# Patient Record
Sex: FEMALE | Race: WHITE | NOT HISPANIC OR LATINO | URBAN - METROPOLITAN AREA
[De-identification: names, ages, dates, MRNs, and addresses within clinical notes are randomized per-mention and may not be internally consistent; named-entity substitution may affect disease eponyms.]

---

## 2024-05-06 ENCOUNTER — CONSULT (OUTPATIENT)
Dept: ENDOCRINOLOGY | Facility: CLINIC | Age: 39
End: 2024-05-06
Payer: COMMERCIAL

## 2024-05-06 DIAGNOSIS — Z31.41 FERTILITY TESTING: ICD-10-CM

## 2024-05-06 DIAGNOSIS — Z31.89 ENCOUNTER FOR FERTILITY PLANNING: ICD-10-CM

## 2024-05-06 DIAGNOSIS — Z31.69 ENCOUNTER FOR PRECONCEPTION CONSULTATION: Primary | ICD-10-CM

## 2024-05-06 PROCEDURE — 99214 OFFICE O/P EST MOD 30 MIN: CPT | Performed by: NURSE PRACTITIONER

## 2024-05-06 PROCEDURE — 99204 OFFICE O/P NEW MOD 45 MIN: CPT | Performed by: NURSE PRACTITIONER

## 2024-05-06 ASSESSMENT — LIFESTYLE VARIABLES
TOBACCO_USE: NO
HISTORY_ALCOHOL_USE: YES

## 2024-05-06 NOTE — PROGRESS NOTES
Visit Type: In Person    NEW FERTILITY PATIENT VISIT    Referred by: Referred by:: N/a    Accompanied today by: Who is accompanying you to your visit:: Christian (Rj Sanderson)      Marcy Putnam is a 39 y.o. G0 female who presents with Please tell us your reason for this visit today: Other  Chronic Kidney Disease Stage 2     Have you had any concerns about your fertility treatments so far? Have you had any concerns about your fertility treatments so far: We’ve had a failed cycle.     What are you goals for today's visit? What are you goals for today's visit: To get info about cost and process at  and to see if there are changes that can be made so we are more successful next time     What causes of infertility have been identified on your workup so far? What causes of infertility have been identified on your workup so far: We didnt start this for infertility- just because we have to use a gestational carrier. But we may also be dealing with age related infertility at this point? We got 2 embryos. One had monosomy 16 and the other had monosomy 18 and xyy.     Past Infertility Treatments: Have you undergone any treatments for fertility: Yes   1 Cycle of IVF at [facility]  Natural start  Antagonist, , 5 units low dose hcg   5 oocytes  3 mature  2 fertilized  2 Blasts,   PGT-A   1 monosomy 16  1 monosomy 18 XYY    Please summarize your fertility treatments to date. Please summarize your fertility treatments to date: see above       PRIOR EVALUATION / TREATMENT  Hysterosalpingogram: None, N/A  Saline Infused Sonography: None, N/A  GYN Pelvic Ultrasound: Yes at [facility]   Impression   Arcuate appearing anteverted uterus that measures 77 mm x 35 mm x 53 mm.   Two echogenic areas superior to the fundal aspect of the endometrium.   Left hemorrhagic cyst.   There is no free fluid visualized in the peritoneal cavity.     Recommendations   Follow up as clinically indicated.     Menstrual History   LMP on 1/13/2023     Method  "  Transvaginal ultrasound examination     Uterus   Uterus: Visualized   Uterus position: anteverted   Description of uterine malformations: arcuate   Myometrium: There are two echogenic, nonvascular areas superior to the fundal   aspect of the endometrium measuring 6 x 4 x 5mm and 7 x 4 x 5mm. This appears to   be   separate from the endometrium and does not look like a typical fibroid. Previous   pelvis MRI on 12-22-17 suggestive of subendometrial cyst vs focal adenomyosis.   Uterus long 77 mm   Uterus ap 35 mm   Uterus tr 53 mm   Uterus Vol 73.2 cm³   Endometrial thickness single layer 7.0 mm   Endom. th. single layer 5.0 mm   Side: right   Side: left   Endometrial thickness, total 6.2 mm     Right Ovary   Rt ovary: Visualized   Rt ovary morphology: normal   Rt ovary D1 20 mm   Rt ovary D2 16 mm   Rt ovary D3 13 mm   Rt ovary Vol 2.2 cm³     Left Ovary   Lt ovary: Visualized   Lt ovary morphology: normal   Lt ovary D1 31 mm   Lt ovary D2 20 mm   Lt ovary D3 19 mm   Lt ovary Vol 6.2 cm³   Lt ovarian cyst D1 17 mm   Lt ovarian cyst D2 10 mm   Lt ovarian cyst D3 16 mm   Lt ovarian cyst mean 14.0 mm   Lt ovarian cyst vol 1.336 cm³   Lt ovarian cyst findings: hemorrhagic cyst- thick rimmed       Prior Labs  AMH 0.84       Relationship Status:  engaged getting  in June 2024     OB Hx  G0  OB History    No obstetric history on file.         GYN HISTORY   Have you ever been diagnosed with a sexually transmitted disease? Have you ever been diagnosed with a sexually transmitted disease?: No    Please select all that are applicable:  n/a  Have you ever had Pelvic Inflammatory Disease? Have you ever had Pelvic Inflammatory Disease?: No    Have you had an abnormal PAP smear? Have you had an abnormal PAP smear?: Yes  HPV +, had Colonoscopy 2014 and LEEP 2014  all paps since LEEP have been normal     Date & Result of last PAP smear: No results found for: \"FINALINTERP\"   Have you ever had an abnormal Mammogram? Have you " ever had an abnormal mammogram?: No    Date & result of your last mammogram:  none, n/a, no breast concerns, no family history of breast cancer   Do you have pelvic pain? None  How many times per week do you have intercourse? If applicable, how many times a week are you having intercourse, trying to get pregnant during your fertile window?: N/A    Do you have pain with intercourse? Do you have pain with intercourse?: No    Do you use lubricants with intercourse?    Do you have pain with bowel movements? Do you have pain with bowel movements?: Yes  Pt has IBS     Do you have pain with a full bladder? Yes with full bladder and with emptying  MENSTRUAL HISTORY  LMP: When was your last menstrual period?: Other  4/14/2024    Menarche: If applicable, when was your first occurrence of menstruation?: Age 13-14 (7th grade)    Contraception: What (if any) type of birth control do you currently use?: Condoms    Cycle length: What is the average number of days between menstrual cycles?: 27    Describe your bleeding: Describe your bleeding:: Average    Dysmenorrhea: Are your menstrual periods painful?: Yes  Moderate cramps first day of menses      ENDOCRINE/INFERTILITY HISTORY  Duration of infertility: If applicable, what is the approximate date you began trying to get pregnant?: Not applicable    Coital Activity/week: If applicable, how many times a week are you having intercourse, trying to get pregnant during your fertile window?: N/A    Nipple Discharge: Do you experience any loss of milk or liquid discharge from the breasts?: No    Vision changes: Are you experiencing any vision changes?: No    Headaches: Are you experiencing headaches?: No    Excess hair growth: Are you experiencing persistent or worsening hair growth on the face, breasts or lower abdomen?: No    Excessive hair loss: Are you experiencing loss of hair from your scalp?: No    Acne: Are you experiencing acne?: No    Oily skin: Oily skin?: No    Recent weight  "change  Weight gain: Are you experiencing any increase in weight?: No    Weight loss: Are you experiencing any decrease in weight?: No    Exercise more than 3 times a week: Exercise more than 3 times a week?: Yes  Strength training, cardio 2 days per week, peloton 30 minuts at a time    PMH  Chronic Kidney Disease   ADHD   IBS  Past Medical History:   Diagnosis Date    Anxiety disorder, unspecified     Anxiety    Personal history of other diseases of the female genital tract     History of abnormal cervical Papanicolaou smear    Personal history of other specified conditions     History of headache    Raynaud's syndrome without gangrene     Raynauds disease    Renal dysplasia     Cystic dysplasia of one kidney        MEDICATIONS  Lisinopril 5 mg PO daily  Synthroid 75 mcg PO daily   Took Growth hormone shots as a child Age 5 until 15 years old     PSH  Right ankle surgery  Ears \"pinned back as child\"   All below correct and verified   Kidney surgery Age 22 on remaining kidney for stricture   Past Surgical History:   Procedure Laterality Date    CERVICAL BIOPSY  W/ LOOP ELECTRODE EXCISION  11/09/2016    Cervical Loop Electrosurgical Excision (LEEP)    COLPOSCOPY  11/09/2016    Colposcopy    KIDNEY SURGERY  11/07/2016    Kidney Surgery    OTHER SURGICAL HISTORY  11/07/2016    Nephrectomy    OTHER SURGICAL HISTORY  11/09/2016    Pyeloplasty        PSYCH HISTORY  Have you ever been diagnosed with a mental health Issue?: Yes  ADHD   Have you ever been hospitalized for a mental health disorder?: No       SOCIAL HISTORY     Occupation: Your occupation:: Professor  Professor of Nursing at [employer]   Have you ever been incarcerated? Have you ever been incarcerated?: No    Do you have a history of domestic violence? Do you have a history of domestic violence?: No    Do you feel safe at home? Do you feel safe at home?: Yes    Do you have a history of any negative sexual experience such as incest or rape? Do you have a " history of any negative sexual experience such as incest or rape?: No       PARTNER HISTORY  Partner Name: Partner name:: Rj Sanderson    Partner : Partner :: [id number]    Partner email: Partner email address: [email or web address]    Occupation: Partner occupation::     Prior fertility history: Partner Prior Fertility History:: One failed ivf cycle with me  None     PMH: Partner Past Medical History:: L1-4 fused due to back injury  None    PSH: Partner Past Surgical History:: PLF    Smoking:Partner: Recent or current tobacco use: No    Alcohol Use: Partner: Recent or current alcohol use: Yes  Social couple times per week     Drug Use: Partner: Recent or current drug use: No    Medications:  none  Injuries: Partner: Any history of surgeries or injuries in the reproductive area?: No    STD: Have you ever been diagnosed with a sexually transmitted disease?: No    Please select all that are applicable:    SA: Prior Semen Analysis completed?: Yes    SA Results: Where the results normal?: Yes  At [facility] was WNL     FAMILY HISTORY  No family history on file.    CANCER HISTORY    Breast: Breast Cancer: Please answer Yes, No or Unsure. If Yes, please, identify which family member.: No    Ovarian: Ovarian Cancer: Please answer Yes, No or Unsure. If Yes, please, identify which family member.: No    Colon: Colon Cancer: Please answer Yes, No or Unsure. If Yes, please, identify which family member.: Yes, maternal great grandmother. I also recently found out my dad is a carrier for a polyposis gene.    Endometrial: Endometrial Cancer: Please answer Yes, No or Unsure. If Yes, please, identify which family member.: No      FAMILY VTE HISTORY  Family History of Blood Clots: Blood Clots: Please answer Yes, No or Unsure. If Yes, please, identify which family member.: No      GENETIC HISTORY  Ethnic Background  Patient: Ethnic Background Patient:: White    Partner: Ethnic Background Partner:: White    Genetic Disease  in Family  Patient: Patient: Defects and genetic syndromes? Please answer Yes, No or Unsure: No    Partner: Partner: Defects and genetic syndromes? Please answer Yes, No or Unsure: No    Birth Defects in Family  Patient: Patient: Birth defects? Please answer Yes, No or Unsure: Yes    Partner: Partner: Birth defects? Please answer Yes, No or Unsure: No    Genetic screening performed previously:  Yes pt and partner had carrier screening at [facility] also saw Genetic counselor at [facility] to assess if pt's kidney disease was genetic and all testing was negative per pt     BMI:   BMI Readings from Last 1 Encounters:   No data found for BMI     VITALS:  There were no vitals taken for this visit.    ASSESSMENT   39 y.o. 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  Partner SA: Normal per pt SA [facility]     COUNSELING  We discussed causes of infertility including hormonal, egg quality issues, structural problems such as endometriosis, adhesions, or tubal problems, uterine factors such as polyps or fibroids, and sperm issues. Reviewed evaluation of such as well. We discussed various methods for achieving pregnancy in some detail including, ovulation induction, insemination, superovulation and IVF.    We discussed the impact of age on fertility. We discussed that a woman is born with all of the follicles that she will have in her lifetime and that these numbers progressively decrease as the patient reaches menopause. We discussed that women can remain fertile into their late 30’s and even early 40’s, however, chance for success is significantly lower for women who have infertility. We also discussed the higher  "rates of aneuploidy and miscarriage that occur as women age.    We discussed AMH testing and the patient's AMH level, if applicable.  AMH is considered favorable if >1 however this test has many limitations including poor predictive rates of pregnancy. In randomized control trials such as \"Time to conceive\" pts with low AMH levels (<0.7) has similar cumulative pregnancy rates compared with women that had normal AMH levels.  In the \"AMIGOS\" study, AMH did not predict pregnancy rates following OS/IUI for unexplained infertility. However,  AMH is a marker that is helpful to predict how a patient may respond or oocyte yields with FSH and ART treatments, but again there is conflicting data about how this affects pregnancy rates with ART.    Discussed use of Gestational carrier for pregnancy. Discussed family/friend vs Agency Carrier. Discussed ethical, legal, and financial considerations. Discussed timeline and logistics of selecting a GC, FET process, and Pregnancy considerations. Discussed need for independent . Briefly discussed state law r/t GC pregnancies. Discussed need for medical insurance policy for GC pregnancy and delivery. Discussed need for psychology consult with  psychologist prior to creation of embryos and again with identified GC. Discussed FDA guidance for Eligibility Determination for Donors of Human Cells, Tissues, and Cellular and Tissue-Based Products. Discussed that testing is required prior to cryopreservation of gametes and/or creation of embryos. Discussed that if embryos are determined to be ineligible future gestational carrier would need to sign FDA waiver where they are counseled on communicable disease risk.       PLAN  Orders Placed This Encounter   Procedures    Referral to Financial Planning    Referral to Ob-Gyn Behavioral Health    Referral to Maternal Fetal Medicine       GENETIC SCREENING PATIENT  Completed previously will need to obtain records from [facility] on screening " and genetic counseling notes    PARTNER  Yes Semen Analysis: Completed previous  Yes Genetic screening: Completed previously    FOLLOW UP   Consults: MFM consult: indication:CKD, Financial consult, and Psych consult: indicationfreezing for future Gestational Carrier Referral to Neeru Caro  Chart to primary nurse for care coordination and patient check list/education  Enroll in Engaged MD  Take prenatal vitamins, vitamin D 2000 IUs daily  Discussed that PAP and mammogram must be updated if appropriate based on age and clinical history and results received before treatment can begin  Discussed that treatment cannot proceed until checklist items are complete   Additional testing for BMI < 18 or > 40: No NA  Schedule IVF consult with Dr. Root  Obtain records from [facility]   Follow up with me for 3rd party testing and to further discuss GC matching/process after IVF consult with Dr. Ivett JOHNSON Completion:  Ectopic Risk: No  Medically Complex: Yes    Fertility Plan Update:    Earnestine Linn  05/06/2024  8:10 AM

## 2024-05-10 ENCOUNTER — CONSULT (OUTPATIENT)
Dept: ENDOCRINOLOGY | Facility: CLINIC | Age: 39
End: 2024-05-10
Payer: COMMERCIAL

## 2024-05-10 VITALS
SYSTOLIC BLOOD PRESSURE: 121 MMHG | BODY MASS INDEX: 29.99 KG/M2 | HEART RATE: 56 BPM | HEIGHT: 65 IN | WEIGHT: 180 LBS | TEMPERATURE: 98 F | DIASTOLIC BLOOD PRESSURE: 80 MMHG

## 2024-05-10 DIAGNOSIS — Z31.69 PROCREATIVE MANAGEMENT COUNSELING: Primary | ICD-10-CM

## 2024-05-10 PROCEDURE — 99215 OFFICE O/P EST HI 40 MIN: CPT | Performed by: OBSTETRICS & GYNECOLOGY

## 2024-05-10 RX ORDER — CHOLECALCIFEROL (VITAMIN D3) 50 MCG
50 TABLET ORAL DAILY
COMMUNITY

## 2024-05-10 RX ORDER — MULTIVIT-MIN/IRON FUM/FOLIC AC 7.5 MG-4
2 TABLET ORAL DAILY
COMMUNITY

## 2024-05-10 RX ORDER — LISINOPRIL 5 MG/1
5 TABLET ORAL
COMMUNITY
Start: 2023-06-05

## 2024-05-10 RX ORDER — LEVOTHYROXINE SODIUM 75 UG/1
75 TABLET ORAL
COMMUNITY
Start: 2024-02-08

## 2024-05-10 RX ORDER — VALACYCLOVIR HYDROCHLORIDE 500 MG/1
500 TABLET, FILM COATED ORAL
COMMUNITY
Start: 2024-02-15

## 2024-05-10 ASSESSMENT — PATIENT HEALTH QUESTIONNAIRE - PHQ9
1. LITTLE INTEREST OR PLEASURE IN DOING THINGS: NOT AT ALL
SUM OF ALL RESPONSES TO PHQ9 QUESTIONS 1 AND 2: 0
2. FEELING DOWN, DEPRESSED OR HOPELESS: NOT AT ALL

## 2024-05-10 ASSESSMENT — PAIN SCALES - GENERAL: PAINLEVEL: 0-NO PAIN

## 2024-05-10 NOTE — PATIENT INSTRUCTIONS
ASSESSMENT   39 y.o.  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 .  Indication for IVF: Indication for Use of Gestational Carrier Chronic kidney disease  Partner SA: Normal     Discussed use of Gestational carrier for pregnancy. Discussed family/friend vs Agency Carrier. Discussed ethical, legal, and financial considerations. Discussed timeline and logistics of selecting a GC, FET process, and Pregnancy considerations. Discussed need for independent . Briefly discussed state law r/t GC pregnancies. Discussed need for medical insurance policy for GC pregnancy and delivery. Discussed need for psychology consult with  psychologist prior to creation of embryos and again with identified GC. Discussed FDA guidance for Eligibility Determination for Donors of Human Cells, Tissues, and Cellular and Tissue-Based Products. Discussed that testing is required prior to cryopreservation of gametes and/or creation of embryos. Discussed that if embryos are determined to be ineligible future gestational carrier would need to sign FDA waiver where they are counseled on communicable disease risk.      We reviewed IVF and discussed the following:     In-vitro fertilization and embryo transfer  Stimulation Protocols  Oocyte retrieval, risks   Cryopreservation  Assessment of fertilization   Embryo cleavage  Statistics  ICSI/ Assisted hatching  Embryo transfer and preparation for future GC  Risks of OHSS and multiple gestation  Dropped cycles  Use of birth control  Selective reduction  Number of embryos to transfer  Ectopic pregnancy  Team of physicians  Informed consent procedure  Folic acid supplementation  Genetic  screening  PGT-A/PGT-M  Gestational Carrier Agencies  Need for Independent   Psychology Consult (3rd party)     ART Cycle Plan     1. Protocol/Fertility Plan Update:   Lead in: estrace  Stimulation protocol: Antagonist /Menopur 150, Clomid first 5 days of stim (Clomid flare), +HGH  Trigger plan: HCG vs Lupron, consider co-trigger  Pre-retrieval meds: Antibiotics per protocol  Adjuncts: HGH  Notes:      2. Insemination:  Sperm source: partner  Sperm collection method: Fresh with Frozen Backup  Notes:  ICSI: Yes  # of oocytes to be fertilized: all     3. Cryopreservation plan  PGT: PGTA   Freeze all? Yes  Oocyte cryopreservation: No     4. Number of embryos to replace to Gestational Carrier: 1 euploid  Stage of embryo transfer: day 5     5. Patient willing to accept blood transfusion: Yes     6. RN to review chart, initiate IP using Gestational Carrier boarding pass, and assure completion of the following prior to proceeding with IVF stimulation:       No orders of the defined types were placed in this encounter.        Baseline set of STI screening for patient and partner to be completed within the past year. If not, obtain at time of consult.   FDA Viromed screening: For oocyte source completed within 30 days of oocyte retrieval. For sperm source completed within 7 days of sperm collection.  FDA physical exam: ensure completed within 6 months of donation for oocyte and sperm source.  FDA questionnaire: ensure completed within 6 months of donation or repeated if donation since last completion for oocyte and sperm source.  Genetic carrier testing: waiver or carrier screen completed with clearance documentation by provider for both patient and partner (z13.71)  Type & Screen to be completed within the last year (z01.83)  AMH to be completed within the last year (z31.41)  Frozen sperm sample: ensure frozen IP sample (z31.41) or verify donor sperm on site prior to stimulation start date.  Verify in  EMR or obtain copy of patient’s last mammogram (if applicable) and pap smear results for provider review in boarding pass.  Enroll in Engaged MD and complete annual consent forms for IP using GC consent and cryotransport agreements.  Consults: Third Party Nursing, Financial Consult,  and Third Party Psychology Consult: ensure completed prior to IVF baseline  Additional consults Behavior Medicine consult and review what is in the boarding pass.  Legal contracts to be completed prior to FET of gestational carrier.     Fertility Plan Update:  MFM consult as above  Earnestine Linn to get set up for GC if patient decides to proceed after MFM consult  Third party psych consult for GC     Poornima Root  05/10/2024  10:06 AM

## 2024-05-10 NOTE — PROGRESS NOTES
Visit Type: In Person    IVF Note    Patient is a 39 y.o.  female with a history of infertility and desire for Gestational Carrier.    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  [facility]H 0.84  [facility]    Status of fallopian tubes: Not assessed     Saline Ultrasound: Not assessesd  TVUS and MRI suggestive of adenomyosis    Hysteroscopy:  Not assessed    Past Infertility Treatments:  See below  1 Cycle of IVF at [facility]  Natural start  Antagonist, , 5 units low dose hcg   5 oocytes  3 mature  2 fertilized  2 Blasts,   PGT-A   1 monosomy 16  1 monosomy 18 XYY      GYN HISTORY   See prior note from 2024    PM  Chronic Kidney Disease   ADHD   IBS  Lisinopril 5 mg PO daily  Synthroid 75 mcg PO daily   Took Growth hormone shots as a child Age 5 until 15 years old   Past Medical History:   Diagnosis Date    Anxiety disorder, unspecified     Anxiety    Personal history of other diseases of the female genital tract     History of abnormal cervical Papanicolaou smear    Personal history of other specified conditions     History of headache    Raynaud's syndrome without gangrene     Raynauds disease    Renal dysplasia     Cystic dysplasia of one kidney       History of any clotting: No  History of easy bleeding/bruising: No    PSH  History of hospitalizations or surgeries: Yes, See    Past Surgical History:   Procedure Laterality Date    CERVICAL BIOPSY  W/ LOOP ELECTRODE EXCISION   "2016    Cervical Loop Electrosurgical Excision (LEEP)    COLPOSCOPY  2016    Colposcopy    KIDNEY SURGERY  2016    Kidney Surgery    OTHER SURGICAL HISTORY  2016    Nephrectomy    OTHER SURGICAL HISTORY  2016    Pyeloplasty       Genetic screening Hx  Patient: Efraín 2bP: testing done at [facility] negaitve per patient  Sperm Source: partner  Sperm: Efraín 2bP: Negative per patient  Need [facility] records to review  Need to review [facility] records  Yes pt and partner had carrier screening at [facility] also saw Genetic counselor at [facility] to assess if pt's kidney disease was genetic and all testing was negative per pt       Social history  Social History     Tobacco Use    Smoking status: Never     Passive exposure: Never    Smokeless tobacco: Never   Substance Use Topics    Alcohol use: Yes    Drug use: Not Currently     Occupation:  Professor    Family history  No family history on file.    Current Meds  Current Outpatient Medications   Medication Sig Dispense Refill    cholecalciferol (Vitamin D-3) 50 MCG (2000 UT) tablet Take 1 tablet (50 mcg) by mouth once daily.      levothyroxine (Synthroid, Levoxyl) 75 mcg tablet Take 1 tablet (75 mcg) by mouth.      lisinopril 5 mg tablet Take 1 tablet (5 mg) by mouth once daily.      multivitamin with minerals tablet Take 2 tablets by mouth once daily.      PNV no.153/FA/om3/dha/epa/fish (PRENATAL GUMMIES ORAL) Take 4 tablets by mouth once daily.      valACYclovir (Valtrex) 500 mg tablet Take 1 tablet (500 mg) by mouth once daily.       No current facility-administered medications for this visit.       Allergies  Sulfabenzamide, Sulfamethoxazole-trimethoprim, Cephalosporins, and Penicillins    Partner History  Name: Rj Sanderson   :  1987  Occupation:   Prior paternity: No  Pertinent history:   SA in past year: Yes, normal at [facility]      VITALS:  /80   Pulse 56   Temp 36.7 °C (98 °F)   Ht 1.651 m (5' 5\")   Wt 81.6 kg (180 lb)   LMP 2024 " (Exact Date)   BMI 29.95 kg/m²   BMI:   BMI Readings from Last 1 Encounters:   05/10/24 29.95 kg/m²     BMI Testing  [facility]  CBC Within 1 year   CMP Within 1 year   HgbA1c Within 1 year   Mag, Phos, Vit D <18 Within 1 year   MFM > 40  REQ   Wt loss consult > 40 OPT     ASSESSMENT   39 y.o.  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 .  Indication for IVF: Indication for Use of Gestational Carrier Chronic kidney disease  Partner SA: Normal    Discussed use of Gestational carrier for pregnancy. Discussed family/friend vs Agency Carrier. Discussed ethical, legal, and financial considerations. Discussed timeline and logistics of selecting a GC, FET process, and Pregnancy considerations. Discussed need for independent . Briefly discussed state law r/t GC pregnancies. Discussed need for medical insurance policy for GC pregnancy and delivery. Discussed need for psychology consult with  psychologist prior to creation of embryos and again with identified GC. Discussed FDA guidance for Eligibility Determination for Donors of Human Cells, Tissues, and Cellular and Tissue-Based Products. Discussed that testing is required prior to cryopreservation of gametes and/or creation of embryos. Discussed that if embryos are determined to be ineligible future gestational carrier would need to sign FDA waiver where they are counseled on communicable disease risk.     We reviewed IVF and discussed the following:    In-vitro fertilization and embryo transfer  Stimulation Protocols  Oocyte retrieval, risks   Cryopreservation  Assessment of fertilization   Embryo cleavage  Statistics  ICSI/ Assisted hatching  Embryo transfer  and preparation for future GC  Risks of OHSS and multiple gestation  Dropped cycles  Use of birth control  Selective reduction  Number of embryos to transfer  Ectopic pregnancy  Team of physicians  Informed consent procedure  Folic acid supplementation  Genetic screening  PGT-A/PGT-M  Gestational Carrier Agencies  Need for Independent   Psychology Consult (3rd party)    ART Cycle Plan    1. Protocol/Fertility Plan Update:   Lead in: estrace  Stimulation protocol: Antagonist /Menopur 150, Clomid first 5 days of stim (Clomid flare), +HGH  Trigger plan: HCG vs Lupron, consider co-trigger  Pre-retrieval meds: Antibiotics per protocol  Adjuncts: HGH  Notes:     2. Insemination:  Sperm source: partner  Sperm collection method: Fresh with Frozen Backup  Notes:  ICSI: Yes  # of oocytes to be fertilized: all    3. Cryopreservation plan  PGT: PGTA   Freeze all? Yes  Oocyte cryopreservation: No    4. Number of embryos to replace to Gestational Carrier: 1 euploid  Stage of embryo transfer: day 5    5. Patient willing to accept blood transfusion: Yes    6. RN to review chart, initiate IP using Gestational Carrier boarding pass, and assure completion of the following prior to proceeding with IVF stimulation:       No orders of the defined types were placed in this encounter.      Baseline set of STI screening for patient and partner to be completed within the past year. If not, obtain at time of consult.   FDA Viromed screening: For oocyte source completed within 30 days of oocyte retrieval. For sperm source completed within 7 days of sperm collection.  FDA physical exam: ensure completed within 6 months of donation for oocyte and sperm source.  FDA questionnaire: ensure completed within 6 months of donation or repeated if donation since last completion for oocyte and sperm source.  Genetic carrier testing: waiver or carrier screen completed with clearance documentation by provider for both patient and partner  (z13.71)  Type & Screen to be completed within the last year (z01.83)  AMH to be completed within the last year (z31.41)  Frozen sperm sample: ensure frozen IP sample (z31.41) or verify donor sperm on site prior to stimulation start date.  Verify in EMR or obtain copy of patient’s last mammogram (if applicable) and pap smear results for provider review in boarding pass.  Enroll in Engaged MD and complete annual consent forms for IP using GC consent and cryotransport agreements.  Consults: Third Party Nursing, Financial Consult,  and Third Party Psychology Consult: ensure completed prior to IVF baseline  Additional consults Behavior Medicine consult and review what is in the boarding pass.  Legal contracts to be completed prior to FET of gestational carrier.    Fertility Plan Update:  MFM consult as above  [facility] Linn to get set up for GC if patient decides to proceed after MFM consult  Third party psych consult for GC    Poornima Root  05/10/2024  10:06 AM

## 2024-05-13 DIAGNOSIS — N97.9 FEMALE INFERTILITY: ICD-10-CM

## 2024-05-13 DIAGNOSIS — Z01.812 PRE-PROCEDURE LAB EXAM: ICD-10-CM

## 2024-05-13 DIAGNOSIS — Z31.83 ENCOUNTER FOR ASSISTED REPRODUCTIVE FERTILITY CYCLE: ICD-10-CM

## 2024-05-31 ENCOUNTER — TELEMEDICINE (OUTPATIENT)
Dept: BEHAVIORAL HEALTH | Facility: CLINIC | Age: 39
End: 2024-05-31
Payer: COMMERCIAL

## 2024-05-31 DIAGNOSIS — F45.42 PAIN DISORDER ASSOCIATED WITH PSYCHOLOGICAL FACTORS AND MEDICAL CONDITION: Primary | ICD-10-CM

## 2024-05-31 PROCEDURE — 90791 PSYCH DIAGNOSTIC EVALUATION: CPT | Performed by: PSYCHOLOGIST

## 2024-05-31 NOTE — PROGRESS NOTES
Psychosocial consult for third party reproduction  Start time 4 pm  End time 4:45 pm  Documentation 10 min  No falls, tobacco use, SI    Marcy Putnam and Rj Sanderson are requesting IVF using their own gametes or possibly donor oocytes and using a gestational carrier (GC).  Relevant History  Marcy, 39, and Rj, 37 have lived together for 2 years and are getting  in 2 weeks. 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 Rj is a .    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 However, she developed problems over the recent past and now is seen by nephrology and was told that carrying a pregnancy would likely impair her remaining kidney function.  She is meeting with [facility] next week to discuss the risks of carrying a pregnancy.  If not recommended they will freeze embryos after an IVF cycle and then start their search for a GC through an agency.  They have already had 1 IVF cycle at [facility] but the 2 embryos created were aneuploid and not transferred. Therefore, the couple is also aware that they may need donor eggs.  Marcy is grieving the loss of carrying a pregnancy and would be sad about not using her own oocytes. She is aware that one of our division would be happy to work with her if she wanted some psychotherapy around this.  The couple had many questions about using a GC some of which were legal and medical and she was referred back to the clinic for referral for agencies, attorneys and medical information.  They are aware that once a GC is identified either our program or the agency will require an assessment of the GC and we would meet with the intended parents and the GC (and partner if their is one).   Both Marcy and Rj deny any psychiatric history, substance abuse or physical or  sexual abuse.  Impression: It is my clinical opinion that Marcy Putnam and Rj Sanderson are able to give informed consent and have considered the psychosocial issues inherent in the third party reproductive options of using a GC with or without donor oocytes.

## 2024-05-31 NOTE — LETTER
May 31, 2024     Poornima Root MD  [address]  [facility] [address]    Patient: Marcy Putnam   YOB: 1985   Date of Visit: 5/31/2024       Dear Dr. Poornima Root MD:    Thank you for referring Marcy Putnam to me for evaluation. Below are my notes for this consultation.  If you have questions, please do not hesitate to call me. I look forward to following your patient along with you.       Sincerely,     Yanet Angeles, PhD      CC: Earnestine Linn, APRN-CNP  Shanae Araujo RN  ______________________________________________________________________________________    Psychosocial consult for third party reproduction  Start time 4 pm  End time 4:45 pm  Documentation 10 min  No falls, tobacco use, SI    Marcy Putnam and Rj Sanderson are requesting IVF using their own gametes or possibly donor oocytes and using a gestational carrier (GC).  Relevant History  Marcy, 39, and Rj, 37 have lived together for 2 years and are getting  in 2 weeks. 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 Rj is a .    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 However, she developed problems over the recent past and now is seen by nephrology and was told that carrying a pregnancy would likely impair her remaining kidney function.  She is meeting with [facility] next week to discuss the risks of carrying a pregnancy.  If not recommended they will freeze embryos after an IVF cycle and then start their search for a GC through an agency.  They have already had 1 IVF cycle at [facility] but the 2 embryos created were aneuploid and not transferred. Therefore, the couple is also aware that they may need donor eggs.  Marcy is grieving the loss of carrying a pregnancy and would be sad about not using  her own oocytes. She is aware that one of our division would be happy to work with her if she wanted some psychotherapy around this.  The couple had many questions about using a GC some of which were legal and medical and she was referred back to the clinic for referral for agencies, attorneys and medical information.  They are aware that once a GC is identified either our program or the agency will require an assessment of the GC and we would meet with the intended parents and the GC (and partner if their is one).   Both Marcy and Rj deny any psychiatric history, substance abuse or physical or sexual abuse.  Impression: It is my clinical opinion that Marcy Putnam and Rj Sanderson are able to give informed consent and have considered the psychosocial issues inherent in the third party reproductive options of using a GC with or without donor oocytes.

## 2024-06-24 ENCOUNTER — APPOINTMENT (OUTPATIENT)
Dept: ENDOCRINOLOGY | Facility: CLINIC | Age: 39
End: 2024-06-24
Payer: COMMERCIAL

## 2024-06-26 ENCOUNTER — INITIAL PRENATAL (OUTPATIENT)
Dept: MATERNAL FETAL MEDICINE | Facility: CLINIC | Age: 39
End: 2024-06-26
Payer: COMMERCIAL

## 2024-06-26 ENCOUNTER — APPOINTMENT (OUTPATIENT)
Dept: MATERNAL FETAL MEDICINE | Facility: CLINIC | Age: 39
End: 2024-06-26
Payer: COMMERCIAL

## 2024-06-26 VITALS — WEIGHT: 176 LBS | DIASTOLIC BLOOD PRESSURE: 87 MMHG | BODY MASS INDEX: 29.29 KG/M2 | SYSTOLIC BLOOD PRESSURE: 135 MMHG

## 2024-06-26 DIAGNOSIS — O09.519 ADVANCED MATERNAL AGE, PRIMIGRAVIDA, ANTEPARTUM (HHS-HCC): ICD-10-CM

## 2024-06-26 DIAGNOSIS — N18.2 STAGE 2 CHRONIC KIDNEY DISEASE: ICD-10-CM

## 2024-06-26 DIAGNOSIS — Z31.69 ENCOUNTER FOR PRECONCEPTION CONSULTATION: Primary | ICD-10-CM

## 2024-06-26 PROCEDURE — 99214 OFFICE O/P EST MOD 30 MIN: CPT | Performed by: OBSTETRICS & GYNECOLOGY

## 2024-06-26 RX ORDER — UBIDECARENONE 30 MG
30 CAPSULE ORAL DAILY
COMMUNITY

## 2024-06-26 NOTE — PROGRESS NOTES
Marcy Putnam is a 39 y.o. G0 presenting for an MFM preconception consultation from [facility] and Dr. [facility] due to chronic kidney disease. She is without complaints today. ROS is negative.      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 Last creatinine 0.97, P:C 100.   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 Records review confirms this finding    Physical exam deferred for this consultative visit    A/P: 38yo G0 presenting for an [facility] preconception consultation. We discussed the following:   CKD-stage II  Marcy Putnam has stage 2 chronic kidney disease with a baseline creatinine ranging 0.9-1.1 mg/dL that has been stable.  She follows with a nephrologis at [facility] (Dr. Claus Montana), last visit 2024.  We discussed the pregnancy itself is a stress to the body and can cause increased renal dysfunction during pregnancy and can lead to irreversible damage.  The two main risk factors for permanent exacerbation of underlying disease in pregnancy is underlying hypertension and creatinine greater than 1.5 mg/dL.      It has been reported that women with underlying renal disease with creatinine between 1.4 to 1.9 mg/dL the risk of decline in kidney function during pregnancy was approximately 40%.  Up to 1/3 of women were  noted to have irreversible decline in renal function with 10% having end-stage renal disease by 12 months postpartum.   For those with less significant renal dysfunction (creatinine <1.5 mg/dL) there is in increased risk of worsening renal dysfunction but the rate of irreversible decline is less ~0-10%.  As a result we recommend assessment of renal function at least every trimester    There is an increase risk of obstetrical complications in women with underlying renal disease including increased risk of preeclampsia/eclampsia,  birth and fetal growth restriction.  We also discussed that preeclampsia can be more difficult to diagnose in these women secondary to them commonly having baseline proteinuria.  We discussed our recommendation for initiation of aspirin 162mg starting at 12 weeks gestation for pre-eclampsia prevention and fetal surveillance with a level 2 ultrasound, serial growth ultrasound and  testing.    Once she does become pregnant we discussed that it would be reasonable for her to be referred to Maternal Fetal Medicine for her prenatal care.    2. AMA  We discussed the implications of advanced maternal age on pregnancy.  Specifically we discussed the increased risk of aneuploidy and her age-related risk. We discussed our recommendation for aneuploidy screening and detailed anatomic study.  We also discussed the increase risk of hypertensive disorders and gestational diabetes as well as growth restriction with advancing maternal age.    In summary, I see no overt contraindications to assisted reproduction. We discussed that she has an increased risk of fetal growth restriction, preeclampsia (including periviable preeclampsia), and permanent worsening of her renal function (0-10%) but pregnancy is NOT overtly contraindicated. Thank you for allowing me to participate in the care of your patient. Please do not hesitate to contact me with any further questions.     Carmen Stoll,  MD  Maternal Fetal Medicine  Director of Fetal Intervention

## 2024-06-28 ENCOUNTER — TELEMEDICINE (OUTPATIENT)
Dept: ENDOCRINOLOGY | Facility: CLINIC | Age: 39
End: 2024-06-28
Payer: COMMERCIAL

## 2024-06-28 VITALS — HEIGHT: 65 IN | WEIGHT: 176 LBS | BODY MASS INDEX: 29.32 KG/M2

## 2024-06-28 DIAGNOSIS — Z31.89 ENCOUNTER FOR FERTILITY PLANNING: Primary | ICD-10-CM

## 2024-06-28 PROCEDURE — 99214 OFFICE O/P EST MOD 30 MIN: CPT | Performed by: NURSE PRACTITIONER

## 2024-06-28 ASSESSMENT — PAIN SCALES - GENERAL: PAINLEVEL: 0-NO PAIN

## 2024-06-28 NOTE — PROGRESS NOTES
Virtual or Telephone Consent: An interactive audio and video telecommunication system which permits real time communications between the patient (at the originating site) and provider (at the distant site) was utilized to provide this telehealth service and verbal consent was requested and obtained from Marcy Indy on this date, 07/15/24 for a telehealth visit.    Follow Up Visit HPI    Patient is a 39 y.o.  female with Indication for Use of Gestational Carrier CKD  presenting today for follow up visit.     Interval History:   MFM consult with Dr. Stoll, no overt contraindication to pregnancy    Previous HPI obtained by Dr. 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  AMH 0.84  [facility]     Status of fallopian tubes: Not assessed      Saline Ultrasound: Not assessesd  TVUS and MRI suggestive of adenomyosis     Hysteroscopy:  Not assessed     Past Infertility Treatments:  See below  1 Cycle of IVF at [facility]  Natural start  Antagonist, , 5 units low dose hcg   5 oocytes  3 mature  2 fertilized  2 Blasts,   PGT-A   1 monosomy 16  1 monosomy 18 XYY        GYN HISTORY   See prior note from 2024     PMH  Chronic Kidney Disease   ADHD   IBS  Lisinopril 5 mg PO daily  Synthroid 75 mcg PO daily   Took Growth hormone shots as a child Age 5 until 15 years old   Medical History        Past Medical History:   Diagnosis Date    Anxiety disorder, unspecified        Anxiety    Personal history of other diseases of the female genital tract       History of abnormal cervical Papanicolaou smear    Personal history of other specified conditions       History of headache    Raynaud's syndrome without gangrene       Raynauds disease    Renal dysplasia       Cystic dysplasia of one kidney            History of any clotting: No  History of easy bleeding/bruising: No     PSH  History of hospitalizations or surgeries: Yes, See     Surgical History         Past Surgical History:   Procedure Laterality Date    CERVICAL BIOPSY  W/ LOOP ELECTRODE EXCISION   11/09/2016     Cervical Loop Electrosurgical Excision (LEEP)    COLPOSCOPY   11/09/2016     Colposcopy    KIDNEY SURGERY   11/07/2016     Kidney Surgery    OTHER SURGICAL HISTORY   11/07/2016     Nephrectomy    OTHER SURGICAL HISTORY   11/09/2016     Pyeloplasty            Genetic screening Hx  Patient: Myriad 2bP: testing done at [facility] negaitve per patient  Sperm Source: partner  Sperm: [employer] 2bP: Negative per patient  Need [facility] records to review  Need to review [facility] records  Yes pt and partner had carrier screening at [facility] also saw Genetic counselor at [facility] to assess if pt's kidney disease was genetic and all testing was negative per pt         Past Medical History:   Diagnosis Date    Anxiety disorder, unspecified     Anxiety    Personal history of other diseases of the female genital tract     History of abnormal cervical Papanicolaou smear    Personal history of other specified conditions     History of headache    Raynaud's syndrome without gangrene     Raynauds disease    Renal dysplasia     Cystic dysplasia of one kidney     Past Surgical History:   Procedure Laterality Date    CERVICAL BIOPSY  W/ LOOP ELECTRODE EXCISION  11/09/2016    Cervical Loop Electrosurgical Excision (LEEP)    COLPOSCOPY  11/09/2016    Colposcopy    KIDNEY SURGERY  11/07/2016    Kidney Surgery    OTHER SURGICAL HISTORY  11/07/2016    Nephrectomy    OTHER  "SURGICAL HISTORY  2016    Pyeloplasty     Current Outpatient Medications on File Prior to Visit   Medication Sig Dispense Refill    cholecalciferol (Vitamin D-3) 50 MCG (2000 UT) tablet Take 1 tablet (50 mcg) by mouth once daily.      co-enzyme Q-10 30 mg capsule Take 1 capsule (30 mg) by mouth once daily.      levothyroxine (Synthroid, Levoxyl) 75 mcg tablet Take 1 tablet (75 mcg) by mouth.      lisinopril 5 mg tablet Take 1 tablet (5 mg) by mouth once daily.      multivitamin with minerals tablet Take 2 tablets by mouth once daily.      PNV no.153/FA/om3/dha/epa/fish (PRENATAL GUMMIES ORAL) Take 4 tablets by mouth once daily.      valACYclovir (Valtrex) 500 mg tablet Take 1 tablet (500 mg) by mouth once daily.       No current facility-administered medications on file prior to visit.       BMI:   BMI Readings from Last 1 Encounters:   24 29.29 kg/m²     VITALS:  Ht 1.651 m (5' 5\")   Wt 79.8 kg (176 lb)   LMP 2024 (Exact Date)   BMI 29.29 kg/m²   LMP: Patient's last menstrual period was 2024 (exact date).    ASSESSMENT   39 y.o.  female  desires to proceed with IVF freeze all for future gestational carrier  Pertinent medical issues: CKD hx of nephrectomy for cystic dysplastic kidney at birth kidney removed at 2 year of age, hx of kidney surgery on remaining kidney for stricture Age 22, hx of growth hormone as a child x10 years unclear indication of use. .  Indication for IVF: Indication for Use of Gestational Carrier Chronic kidney disease  Partner SA: Normal    COUNSELING  Discussed use of Gestational carrier for pregnancy. Discussed family/friend vs Agency Carrier. Discussed ethical, legal, and financial considerations. Discussed timeline and logistics of selecting a GC, FET process, and Pregnancy considerations. Discussed need for independent . Briefly discussed state law r/t GC pregnancies. Discussed need for medical insurance policy for GC pregnancy and delivery. " Discussed need for psychology consult with  psychologist prior to creation of embryos and again with identified GC. Discussed FDA guidance for Eligibility Determination for Donors of Human Cells, Tissues, and Cellular and Tissue-Based Products. Discussed that testing is required prior to cryopreservation of gametes and/or creation of embryos. Discussed that if embryos are determined to be ineligible future gestational carrier would need to sign FDA waiver where they are counseled on communicable disease risk.       PLAN  No orders of the defined types were placed in this encounter.      FOLLOW UP   Consults: MFM consult: indication:s/p mfm consult see note from Dr. Stoll, Financial consult, and Psych consult: indicationfreezing for future gestational carrier  Engaged MD  Take prenatal vitamins, vitamin D 2000 IUs daily, CoQ10   Follow up with MARI Calvin to coordinate IVF cycle (freezing for future Gestational Carrier) needs to be FDA eligible   Once pt is happy with number of embryos cryopreserved will TTC on own for 3-6 months      Earnestine Linn  06/28/2024  1:28 PM

## 2024-07-29 DIAGNOSIS — E03.8 OTHER SPECIFIED HYPOTHYROIDISM: ICD-10-CM

## 2024-07-29 RX ORDER — LEVOTHYROXINE SODIUM 75 UG/1
75 TABLET ORAL DAILY
Qty: 30 TABLET | Refills: 0 | Status: SHIPPED | OUTPATIENT
Start: 2024-07-29

## 2024-08-22 ENCOUNTER — APPOINTMENT (OUTPATIENT)
Dept: GASTROENTEROLOGY | Facility: CLINIC | Age: 39
End: 2024-08-22
Payer: COMMERCIAL

## 2024-08-30 ENCOUNTER — TELEMEDICINE (OUTPATIENT)
Dept: ENDOCRINOLOGY | Facility: CLINIC | Age: 39
End: 2024-08-30
Payer: COMMERCIAL

## 2024-08-30 DIAGNOSIS — Z31.89 ENCOUNTER FOR FERTILITY PLANNING: Primary | ICD-10-CM

## 2024-08-30 DIAGNOSIS — N92.6 IRREGULAR MENSES: ICD-10-CM

## 2024-08-30 PROCEDURE — 99215 OFFICE O/P EST HI 40 MIN: CPT | Performed by: OBSTETRICS & GYNECOLOGY

## 2024-08-30 NOTE — PROGRESS NOTES
"  Virtual or Telephone Consent: An interactive audio and video telecommunication system which permits real time communications between the patient (at the originating site) and provider (at the distant site) was utilized to provide this telehealth service    Follow Up Visit HPI    Patient is a 39 y.o.  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     Previously has seen audrey and Earnestine Linn to discuss gestational carrier due to CKD.  She saw MFM and now desires to consider pregnancy herself.     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 Last creatinine 0.97, P:C 100. \"    \"In summary, I see no overt contraindications to assisted reproduction. We discussed that she has an increased risk of fetal growth restriction, preeclampsia (including periviable preeclampsia), and permanent worsening of her renal function (0-10%) but pregnancy is NOT overtly contraindicated. Thank you for allowing me to participate in the care of your patient. Please do not hesitate to contact me with any further questions. \"    After this counseling, patient and partner decided to try to conceive naturally.  Attempted pregnancy naturally in August and had biochemical pregnancy--> positive home pregnancy test and delayed/heavy period.     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    Has been using condoms for contraception.       Testing to date:    Latest Reference Range & Units Most Recent   Hemoglobin A1C 4.3 - 5.6 % 5.3 (E)  24 07:50   Thyroid Stimulating Hormone 0.44 - 3.98 mIU/L 2.66  10/3/19 09:24   GLUCOSE 74 - 99 mg/dL 93  10/3/19 09:24   Estimated Average Glucose mg/dL 105 (E)  24 07:50   (E): External lab result  Other: [facility] labs  Creatinine 0.97 (2024)  AMH 0.84 (2024)  TSH 2024 2.410 -- Currently on 75 mcg synthroid     Had had STDs done through FDA lab for Viromeds  10/2023 and 3/2024    Status of fallopian tubes: Not assessed      Saline Ultrasound: Not assessesd  TVUS and MRI suggestive of adenomyosis     Hysteroscopy:  Not assessed       Partner SA: Normal  Name: Rj Sanderson   :  1987  Occupation:   Prior paternity: No  Pertinent history:   SA in past year: Yes, normal at [facility]    Sperm Concentration  >=15.00 M/mL 45.50   Total Sperm Count  M 259.35   % Motile Sperm (%HI + %NP)  >=40 % 52   Forward Progression 2 = Poor to moderate, erratic   Total Motile Sperm  M 134.86   % Normal Forms, WHO (5th ed.)  >=4 % 4         Genetic screening Hx  Patient: Myriad 2bP: testing done at [facility] negaitve per patient  Sperm Source: partner  Sperm: Myriad 2bP: Negative per patient  Need [facility] records to review  Need to review [facility] records  Yes pt and partner had carrier screening at [facility] also saw Genetic counselor at [facility] to assess if pt's kidney disease was genetic and all testing was negative per pt        Treatment to date:   Fertility Cycles       Cycle Name Treatment Start Date Type Outcome    IVF (for future GC) #1  ICSI, Egg Retrieval, Cryopreservation (Embryo) Active          Past Infertility Treatments:  See below  1 Cycle of IVF at [facility]  Natural start  Antagonist, , 5 units low dose hcg   5 oocytes  3 mature  2 fertilized  2 Blasts,   PGT-A   1 monosomy 16  1 monosomy 18  XYY    PMH:  Chronic Kidney Disease   ADHD   IBS  Lisinopril 5 mg PO daily--> Stopped end of July   Synthroid 75 mcg PO daily   Took Growth hormone shots as a child Age 5 until 15 years old   Past Medical History:   Diagnosis Date    Anxiety disorder, unspecified     Anxiety    Personal history of other diseases of the female genital tract     History of abnormal cervical Papanicolaou smear    Personal history of other specified conditions     History of headache    Raynaud's syndrome without gangrene     Raynauds disease    Renal dysplasia     Cystic dysplasia of one kidney     Past Surgical History:   Procedure Laterality Date    CERVICAL BIOPSY  W/ LOOP ELECTRODE EXCISION  11/09/2016    Cervical Loop Electrosurgical Excision (LEEP)    COLPOSCOPY  11/09/2016    Colposcopy    KIDNEY SURGERY  11/07/2016    Kidney Surgery    OTHER SURGICAL HISTORY  11/07/2016    Nephrectomy    OTHER SURGICAL HISTORY  11/09/2016    Pyeloplasty     Current Outpatient Medications on File Prior to Visit   Medication Sig Dispense Refill    cholecalciferol (Vitamin D-3) 50 MCG (2000 UT) tablet Take 1 tablet (50 mcg) by mouth once daily.      co-enzyme Q-10 30 mg capsule Take 1 capsule (30 mg) by mouth once daily.      levothyroxine (Synthroid, Levoxyl) 75 mcg tablet Take 1 tablet (75 mcg) by mouth early in the morning.. Take on an empty stomach first thing in the morning, nothing to eat or drink besides water for 1 hour, as well as other medications. Take prenatals in the evening. 30 tablet 0    lisinopril 5 mg tablet Take 1 tablet (5 mg) by mouth once daily.      multivitamin with minerals tablet Take 2 tablets by mouth once daily.      PNV no.153/FA/om3/dha/epa/fish (PRENATAL GUMMIES ORAL) Take 4 tablets by mouth once daily.      valACYclovir (Valtrex) 500 mg tablet Take 1 tablet (500 mg) by mouth once daily.       No current facility-administered medications on file prior to visit.       BMI:   BMI Readings from Last 1 Encounters:    24 29.29 kg/m²     VITALS:  There were no vitals taken for this visit.  LMP: No LMP recorded.    ASSESSMENT   39 y.o.  female with  years, Diminished Ovarian Reserve and Chronic Kidney Disease  and the following pertinent medical issues: CKD, s/p MFM consult- has decided to try to conceive naturally  and has had 1 biochemical pregnancy on first month trying    COUNSELING  We reviewed the definition of infertility and the recommendation to do a workup after 6 months in women > age 38    We also discussed the impact of age on fertility. We discussed that a woman is born with all of the follicles that she will have in her lifetime and that these numbers progressively decreased as the patient reaches menopause. We discussed fertile woman can remain fertile into their late 30s and even early 40s, however, this number may be lower for women who have infertility. We also discussed the higher rate of aneuploidy that occurs as women age.    Discussed that there are many reasons for a biochemical pregnancy, including embryo aneuploidy; cannot rule out other factors in her situation due to there history of pituitary dysfunction/GH deficiency as a child.     We reviewed that I would not want to start with OI meds as these would increase her risk of multiples which would increase her risk of pregnancy complications- we can consider if there is an indication in the future.  However, luteal phase progesterone supplementation would be reasonable to consider.     Routine Testing  Fertility Center  STDs Within 1 year   Genetic carrier Waiver/Completed   T&S Within 1 year   AMH Within 1 year   TSH Within 1 year   Rubella/Varicella Within 5 years     BMI Testing  Fertility Center  CBC Within 1 year   CMP Within 1 year   HgbA1c Within 1 year   Mag, Phos, Vit D <18 Within 1 year   MFM > 40  REQ   Wt loss consult > 40 OPT     PLAN  No orders of the defined types were placed in this encounter.      FOLLOW UP   Consults:   None, already had MFM consult, stopped lisonopril  Engaged MD  Take prenatal vitamins, vitamin D 2000 IUs daily  Discussed that treatment cannot proceed until checklist items are complete.   Additional testing for BMI < 18 or > 40: NA.  Chart to primary nurse for care coordination and patient check list/education.    MD Completion:  Ectopic Risk:  No for now, tubes not assessesd  Medically Complex: Yes  Outstanding boarding pass items: None- had STDs done through FDA lab ([facility]) on March- will need to get records    Fertility Plan Update:  Patient will attempt natural conception this month  If no pregnancy will likely try luteal phase prometrium supplementation next cycle- to start 4 days after +LH surge, 100 mg vaginally twice daily  If no pregnancy in ~3-6 months recommend HSG to assess for tubal patency and consider Clomid or Letrozole IUI at that time. Patient may reconsider IVF in the future but did not have success at [facility].     Poornima Root  08/30/2024  7:32 AM

## 2024-08-30 NOTE — PATIENT INSTRUCTIONS
Fertility Center  Prometrium   (micronized progesterone)  Indications for Use:  Prometrium (micronized progesterone) is a natural progesterone given to support the lining of your uterus after ovulation and during implantation of the embryo  Because use of Prometrium helps to maintain the lining of the uterus, it may also delay the onset of menses even if you do not become pregnant the cycle that you use it  Prometrium is an oral tablet and is also safe to use vaginally; using it vaginally improves absorption and provides quicker delivery to the uterus  You may read information that Prometrium is not safe to use during pregnancy. Please be assured that when used as prescribed for your treatment, it is accepted medical practice    How to Use Prometrium:  The recommended dose for you is:        ____X__ 100 mg intravaginal twice per day  You will start using your Prometrium according to your plan of care :        ______ The 3rd day after you ovulate or the 3rd day after your insemination (date: ______________)         ____X__ The 4th day after you have a positive ovulation predictor/LH surge (date: _______________)         ______ The 5th day after your Ovidrel injection if you are not doing an insemination (date: _____________)  If you have intercourse at night, please insert your Prometrium after intercourse  Duration of Prometrium Use:  If you have a period with a normal flow approximately 2 weeks from your ovulation or insemination date, you can discontinue the Prometrium  If you have only spotting or no flow at all, it is recommended to do a pregnancy test 2 weeks after ovulation or insemination. You can either do:  A home pregnancy test with first morning urine OR  A blood test in the office   If home pregnancy test is positive: continue on Prometrium and schedule a blood pregnancy test at the office  If home pregnancy test or blood test are negative: discontinue the Prometrium and call the office on your first day  of full flow of your period to start a new treatment cycle  If there is any doubt about your home pregnancy test result: call the office to schedule a blood test and continue Prometrium    Contraindications:  DO NOT TAKE PROMETRIUM IF YOU OR YOUR PARTNER ARE ALLERGIC TO PEANUTS  Please let us know if you have a history of blood clots or liver disease    Side Effects:   Mood swings, headaches, mild cramps, breast tenderness, fatigue, nausea, insomnia, vaginal irritation, heartburn, constipation  Please notify the office immediately ([phone]) if you experience sudden onset of severe headache, leg pain or swelling or any other significant changes

## 2024-09-13 DIAGNOSIS — E03.8 OTHER SPECIFIED HYPOTHYROIDISM: ICD-10-CM

## 2024-09-16 RX ORDER — LEVOTHYROXINE SODIUM 75 UG/1
75 TABLET ORAL DAILY
Qty: 30 TABLET | Refills: 0 | Status: SHIPPED | OUTPATIENT
Start: 2024-09-16

## 2024-09-19 DIAGNOSIS — N97.9 FEMALE INFERTILITY: ICD-10-CM

## 2024-09-20 ENCOUNTER — TELEMEDICINE (OUTPATIENT)
Dept: ENDOCRINOLOGY | Facility: CLINIC | Age: 39
End: 2024-09-20
Payer: COMMERCIAL

## 2024-09-20 VITALS — WEIGHT: 178 LBS | BODY MASS INDEX: 29.66 KG/M2 | HEIGHT: 65 IN

## 2024-09-20 DIAGNOSIS — N97.9 FEMALE INFERTILITY: Primary | ICD-10-CM

## 2024-09-20 PROCEDURE — 1036F TOBACCO NON-USER: CPT

## 2024-09-20 PROCEDURE — 99212 OFFICE O/P EST SF 10 MIN: CPT

## 2024-09-20 RX ORDER — PROGESTERONE 100 MG/1
100 CAPSULE ORAL 2 TIMES DAILY
Qty: 60 CAPSULE | Refills: 0 | Status: SHIPPED | OUTPATIENT
Start: 2024-09-20 | End: 2025-09-20

## 2024-09-20 ASSESSMENT — PATIENT HEALTH QUESTIONNAIRE - PHQ9
SUM OF ALL RESPONSES TO PHQ9 QUESTIONS 1 AND 2: 0
2. FEELING DOWN, DEPRESSED OR HOPELESS: NOT AT ALL
1. LITTLE INTEREST OR PLEASURE IN DOING THINGS: NOT AT ALL

## 2024-09-20 ASSESSMENT — PAIN SCALES - GENERAL: PAINLEVEL: 0-NO PAIN

## 2024-09-20 NOTE — PROGRESS NOTES
Virtual or Telephone Consent: An interactive audio and video telecommunication system which permits real time communications between the patient (at the originating site) and provider (at the distant site) was utilized to provide this telehealth service      Boarding Pass Oral TIC/IUI    Age: 39 y.o.    Provider: Poornima Root MD  Primary RN: Shanae Araujo  Reasons for Treatment: Diminished Ovarian Reserve and Chronic Kidney disease  Last BMI  24 : 29.29 kg/m²       Past Medical History:   Diagnosis Date    Anxiety disorder, unspecified     Anxiety    Personal history of other diseases of the female genital tract     History of abnormal cervical Papanicolaou smear    Personal history of other specified conditions     History of headache    Raynaud's syndrome without gangrene     Raynauds disease    Renal dysplasia     Cystic dysplasia of one kidney       Date Done Consultation Results/Comments   24 Medication Protocol   Fertility Plan Update:: will likely try luteal phase prometrium supplementation next cycle- to start 4 days after +LH surge, 100 mg vaginally twice daily  If no pregnancy in ~3-6 months recommend HSG to assess for tubal patency and consider Clomid or Letrozole IUI at that time. Patient may reconsider IVF in the future but did not have success at [facility].     Fertility Plan Update 24: Letrozole 5 mg CD3-7 with office monitoring and hCG trigger for partner IUI, prometrium vaginally for luteal support.   Will likely do this for 1-2 cycles prior to getting set up for another IVF cycle     2024 [facility]  cleared   NA Procedure Order Placed []  N/A   Date Done Female Labs Results/Comments   24  (Scanned in Media in records) T&S (Q 1 Year) ABO: A  Rh: POS  Antibody: NEG     2024 TSH 2.410   24  (Scanned in Media in records) Hep B sAg NEGATIVE   24  (Scanned in Media in records) Hep C AB NEGATIVE   24  (Scanned in Media in records) HIV NEGATIVE    2/22/24  (Scanned in Media in records) Syphilis NEGATIVE   2/22/24  (Scanned in Media in records) GC/CT GC: NEGATIVE  CT: NEGATIVE   4- Lipid panel normal   4- CBC normal    CMP Normal except creatinine slightly elevated at 1.02, has CKD Stage 2   4- HgbA1C 5.3%   4- AMH 0.84   10/30/23 Rubella (Q 5 Years) IMMUNE   10/30/23 Varicella (Q 5 Years) IMMUNE   2022 Carrier Screening Myriad 2bP: Completed previously at [facility]  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   Date Done Male Labs (required if IUI)  N/A_ TIC   Results/Comments  ROBERT THOMPSON (MRN: [id number])   11/22/23  (Viromeds in Media) Hep B sAg negative   11/22/23  (Viromeds in Media) Hep C AB  negative   11/22/23  (Viromeds in Media) HIV negative   11/22/23  (Viromeds in Media) Syphilis negative   11/22/23  (Viromeds in Media) GC/CT GC:   negative  CT:    negative   2022 Carrier Screening Myriad- completed at [facility]  Pos Biotinidase Deficiency   2023 [facility] Semen Analysis  Volume(mL): 5.7  Count(million): 259  Motility(%): 52  Motile Count(million): 134   Date Done Miscellaneous Results/Comments    BMI Checklist  BMI > 40 or < 18 Added to chart:   No    >= 45 Checklist  Added to chart:   No     MD Completion:  Ectopic Risk: No  Medically Complex: Yes    Boarding Pass reviewed with GAYLE Prado LPN and is complete. patient is cleared for fertility treatment: LMP 9-. LP Prometrium 100 mg BID PV to start 4 day after a + OPK . If no pregnancy in ~3-6 months recommend HSG to assess for tubal patency and consider Clomid or Letrozole IUI at that time. Patient may reconsider IVF in the future but did not have success at [facility]. Magali Cárdenas CNP 09/20/24 9:01 AM     Boarding pass reviewed with:  HSG normal  MFM clearance obtained.    Protocol: Letrozole 5 mg CD3-7 with office monitoring and hCG trigger for partner IUI, prometrium vaginally for luteal support x1-2 cycles  Sperm: partner  Testing up to date.  yes  Procedure order placed. yes    Boarding pass approved for 3 cycles/until 11/22/24    Maria Elena Monroe 11/05/24 4:18 PM    Addended Boarding Pass: Boarding Pass reviewed with EPIFANIO Araujo RN and is complete. patient is cleared for fertility treatment: Letrozole 5 mg days 3-7/monitoring/ hCG trigger/with Luteal Estrace 2 mg BID orally & Prometrium 100 mg BID PV to start 5 days after Trigger/TIC x 2-3 cycles. Magali Cárdenas CNP 12/06/24 3:44 PM

## 2024-09-23 DIAGNOSIS — N92.6 IRREGULAR MENSES: Primary | ICD-10-CM

## 2024-10-01 ENCOUNTER — APPOINTMENT (OUTPATIENT)
Dept: ENDOCRINOLOGY | Facility: CLINIC | Age: 39
End: 2024-10-01
Payer: COMMERCIAL

## 2024-10-07 ENCOUNTER — LAB (OUTPATIENT)
Dept: LAB | Facility: LAB | Age: 39
End: 2024-10-07
Payer: COMMERCIAL

## 2024-10-07 DIAGNOSIS — Z31.41 FERTILITY TESTING: Primary | ICD-10-CM

## 2024-10-07 DIAGNOSIS — Z32.01 POSITIVE URINE PREGNANCY TEST (HHS-HCC): ICD-10-CM

## 2024-10-07 DIAGNOSIS — N92.6 IRREGULAR MENSES: ICD-10-CM

## 2024-10-07 DIAGNOSIS — Z01.812 ENCOUNTER FOR PREPROCEDURAL LABORATORY EXAMINATION: ICD-10-CM

## 2024-10-07 DIAGNOSIS — Z13.29 SCREENING FOR THYROID DISORDER: ICD-10-CM

## 2024-10-07 LAB
B-HCG SERPL-ACNC: <2 MIU/ML
TSH SERPL-ACNC: 2.53 MIU/L (ref 0.44–3.98)

## 2024-10-07 PROCEDURE — 84443 ASSAY THYROID STIM HORMONE: CPT

## 2024-10-07 PROCEDURE — 36415 COLL VENOUS BLD VENIPUNCTURE: CPT

## 2024-10-07 PROCEDURE — 84702 CHORIONIC GONADOTROPIN TEST: CPT

## 2024-10-07 NOTE — PROGRESS NOTES
Patient had BHCG drawn today as she thought she saw a faint positive on her test this morning.  BHCG was negative, let patient know.  Pt likely wants to proceed with HSG with menses.    10/07/24 at 12:41 PM - Shanae Araujo RN

## 2024-10-10 ENCOUNTER — TELEPHONE (OUTPATIENT)
Dept: ENDOCRINOLOGY | Facility: CLINIC | Age: 39
End: 2024-10-10

## 2024-10-10 NOTE — TELEPHONE ENCOUNTER
Reason for call: Patient started her cycle today calling to get scheduled for her HSG no order please put an order.  Notes:

## 2024-10-15 DIAGNOSIS — E03.8 OTHER SPECIFIED HYPOTHYROIDISM: ICD-10-CM

## 2024-10-15 RX ORDER — LEVOTHYROXINE SODIUM 75 UG/1
75 TABLET ORAL DAILY
Qty: 30 TABLET | Refills: 2 | Status: SHIPPED | OUTPATIENT
Start: 2024-10-15

## 2024-10-22 ENCOUNTER — HOSPITAL ENCOUNTER (OUTPATIENT)
Dept: RADIOLOGY | Facility: HOSPITAL | Age: 39
Discharge: HOME | End: 2024-10-22
Payer: COMMERCIAL

## 2024-10-22 ENCOUNTER — ANCILLARY PROCEDURE (OUTPATIENT)
Dept: ENDOCRINOLOGY | Facility: CLINIC | Age: 39
End: 2024-10-22
Payer: COMMERCIAL

## 2024-10-22 DIAGNOSIS — Z01.812 ENCOUNTER FOR PREPROCEDURAL LABORATORY EXAMINATION: ICD-10-CM

## 2024-10-22 DIAGNOSIS — Z31.41 FERTILITY TESTING: Primary | ICD-10-CM

## 2024-10-22 DIAGNOSIS — Z31.41 FERTILITY TESTING: ICD-10-CM

## 2024-10-22 LAB — PREGNANCY TEST URINE, POC: NEGATIVE

## 2024-10-22 PROCEDURE — 58340 CATHETER FOR HYSTEROGRAPHY: CPT

## 2024-10-22 PROCEDURE — 2550000001 HC RX 255 CONTRASTS: Performed by: STUDENT IN AN ORGANIZED HEALTH CARE EDUCATION/TRAINING PROGRAM

## 2024-10-22 PROCEDURE — 74740 X-RAY FEMALE GENITAL TRACT: CPT

## 2024-10-22 PROCEDURE — 74740 X-RAY FEMALE GENITAL TRACT: CPT | Performed by: OBSTETRICS & GYNECOLOGY

## 2024-10-22 NOTE — PROGRESS NOTES
Patient ID: Marcy Putnam is a 39 y.o. female.    HSG    Date/Time: 10/22/2024 7:33 AM    Performed by: KIERSTEN Carrillo  Authorized by: KIERSTEN Carrillo    Consent:     Consent obtained:  Verbal and written    Consent given by:  Patient    Risks, benefits, and alternatives were discussed: yes      Risks discussed:  Bleeding, infection and pain  Universal protocol:     Procedure explained and questions answered to patient or proxy's satisfaction: yes      Relevant documents present and verified: yes      Test results available: yes      Imaging studies available: yes      Required blood products, implants, devices, and special equipment available: yes      Immediately prior to procedure, a time out was called: yes      Patient identity confirmed:  Verbally with patient, arm band and hospital-assigned identification number  Indications:     Indications:  Fertility testing  Pre-procedure details:     Skin preparation:  Povidone-iodine  Sedation:     Sedation type:  None  Anesthesia:     Anesthesia method:  None  Procedure specific details:      AJ Cárdenas CNP performed this procedure      Hysterosalpingogram (HSG) risks, benefits, alternatives, and personnel discussed with patient who agreed to proceed.    Procedural time out        Done in room where procedure done: Yes        Done just before starting procedure: Yes                                                 All members of procedural team involved in time-out: Yes                  Active communication used: Yes                                                         All team members agreed on procedure: Yes                                        Patient correctly identified by two identifiers: Yes                                  Correct side and site identified: Yes                                                     All needed special equipment/instruments available: Yes               Prior to the start of the procedure a time out was taken and  the following were verified: The identity of the patient using two patient identifiers.   Urine pregnancy test was performed and was negative.   Risks, benefits, and alternatives of the procedure were explained to the patient.  Informed consent was obtained.     The patient was placed in the dorsal lithotomy position and a sterile speculum was placed in the vagina. The cervix was sterilized with Betadine x 3. The anterior lip of the cervix was grasped with a single-tooth tenaculum. The acorn cannula was then placed in the cervix. The patient was positioned and images were taken with fluoroscopy as dye was inserted through the cannula. All instruments were then removed. The patient tolerated the procedure well and was discharged home the same day without complications.    Uterus:  irregular/notched with slightly arcuate and broad uterine contour without filling defects noted, uterus position tilted to side   Tubes:  right tubal patency with distal dilation noted and spill seen, left tube with distal fluid loculation noted and spill seen.  Based on these findings, my recommendation is: No further follow up required. Dr. Yanez reviewed the images and no additional testing is needed.     The patient was counseled regarding the above findings and images were reviewed with patient at the time of the exam.     Magali Cárdenas CNP 10/22/24 5:20 PM       Post-procedure details:     Procedure completion:  Tolerated well, no immediate complications

## 2024-11-05 ENCOUNTER — TELEPHONE (OUTPATIENT)
Dept: ENDOCRINOLOGY | Facility: CLINIC | Age: 39
End: 2024-11-05

## 2024-11-05 ENCOUNTER — APPOINTMENT (OUTPATIENT)
Dept: ENDOCRINOLOGY | Facility: CLINIC | Age: 39
End: 2024-11-05
Payer: COMMERCIAL

## 2024-11-05 VITALS — WEIGHT: 180 LBS | HEIGHT: 65 IN | BODY MASS INDEX: 29.99 KG/M2

## 2024-11-05 DIAGNOSIS — Z31.89 ENCOUNTER FOR ARTIFICIAL INSEMINATION: ICD-10-CM

## 2024-11-05 DIAGNOSIS — N97.9 FEMALE INFERTILITY: Primary | ICD-10-CM

## 2024-11-05 DIAGNOSIS — N97.9 FEMALE INFERTILITY: ICD-10-CM

## 2024-11-05 PROCEDURE — 1036F TOBACCO NON-USER: CPT | Performed by: OBSTETRICS & GYNECOLOGY

## 2024-11-05 PROCEDURE — 99215 OFFICE O/P EST HI 40 MIN: CPT | Performed by: OBSTETRICS & GYNECOLOGY

## 2024-11-05 RX ORDER — PROGESTERONE 100 MG/1
100 CAPSULE ORAL 2 TIMES DAILY
Qty: 60 CAPSULE | Refills: 0 | Status: SHIPPED | OUTPATIENT
Start: 2024-11-05 | End: 2025-11-05

## 2024-11-05 RX ORDER — LETROZOLE 2.5 MG/1
5 TABLET, FILM COATED ORAL DAILY
Qty: 10 TABLET | Refills: 0 | Status: SHIPPED | OUTPATIENT
Start: 2024-11-05 | End: 2025-02-03

## 2024-11-05 RX ORDER — CHORIONIC GONADOTROPIN 10000 UNIT
10000 KIT INTRAMUSCULAR ONCE AS NEEDED
Qty: 1 EACH | Refills: 0 | Status: SHIPPED | OUTPATIENT
Start: 2024-11-05

## 2024-11-05 ASSESSMENT — PATIENT HEALTH QUESTIONNAIRE - PHQ9
5. POOR APPETITE OR OVEREATING: NEARLY EVERY DAY
6. FEELING BAD ABOUT YOURSELF - OR THAT YOU ARE A FAILURE OR HAVE LET YOURSELF OR YOUR FAMILY DOWN: NOT AT ALL
3. TROUBLE FALLING OR STAYING ASLEEP OR SLEEPING TOO MUCH: NOT AT ALL
10. IF YOU CHECKED OFF ANY PROBLEMS, HOW DIFFICULT HAVE THESE PROBLEMS MADE IT FOR YOU TO DO YOUR WORK, TAKE CARE OF THINGS AT HOME, OR GET ALONG WITH OTHER PEOPLE: EXTREMELY DIFFICULT
2. FEELING DOWN, DEPRESSED OR HOPELESS: NEARLY EVERY DAY
8. MOVING OR SPEAKING SO SLOWLY THAT OTHER PEOPLE COULD HAVE NOTICED. OR THE OPPOSITE, BEING SO FIGETY OR RESTLESS THAT YOU HAVE BEEN MOVING AROUND A LOT MORE THAN USUAL: NOT AT ALL
1. LITTLE INTEREST OR PLEASURE IN DOING THINGS: NOT AT ALL
9. THOUGHTS THAT YOU WOULD BE BETTER OFF DEAD, OR OF HURTING YOURSELF: NOT AT ALL
SUM OF ALL RESPONSES TO PHQ QUESTIONS 1-9: 8
4. FEELING TIRED OR HAVING LITTLE ENERGY: MORE THAN HALF THE DAYS
7. TROUBLE CONCENTRATING ON THINGS, SUCH AS READING THE NEWSPAPER OR WATCHING TELEVISION: NOT AT ALL
SUM OF ALL RESPONSES TO PHQ9 QUESTIONS 1 AND 2: 3

## 2024-11-05 ASSESSMENT — PAIN SCALES - GENERAL: PAINLEVEL_OUTOF10: 4

## 2024-11-05 NOTE — TELEPHONE ENCOUNTER
Reason for call:   Notes: Patient has decided to go this an IUI cycle for this month and would like to speak with a nurse.

## 2024-11-05 NOTE — PROGRESS NOTES
"  Virtual or Telephone Consent: An interactive audio and video telecommunication system which permits real time communications between the patient (at the originating site) and provider (at the distant site) was utilized to provide this telehealth service    Follow Up Visit HPI    Patient is a 39 y.o.  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     Interval history:  Had HSG - broad-based uterus with no filling defects noted and bilateral tubal patency     -Did natural cycle with TIC- also luteal phase progesterone, no pregnancy.  Has only done 1 (could try last month due to HSG timing).         Prior note below reviewed:  Previously has seen and and Earnestine Linn to discuss gestational carrier due to CKD.  She saw MFM and now desires to consider pregnancy herself.     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 Last creatinine 0.97, P:C 100. \"    \"In summary, I see no overt contraindications to assisted reproduction. We discussed that she has an increased risk of fetal growth restriction, preeclampsia (including periviable preeclampsia), and permanent worsening of her renal function (0-10%) but pregnancy is NOT overtly contraindicated. Thank you for allowing me to participate in the care of your patient. Please do not hesitate to contact me with any further questions. \"    After this counseling, patient and partner decided to try to conceive naturally.  Attempted pregnancy naturally in August and had " biochemical pregnancy--> positive home pregnancy test and delayed/heavy period.     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    Has been using condoms for contraception.       Testing to date:    Latest Reference Range & Units Most Recent   Hemoglobin A1C 4.3 - 5.6 % 5.3 (E)  24 07:50   Thyroid Stimulating Hormone 0.44 - 3.98 mIU/L 2.66  10/3/19 09:24   GLUCOSE 74 - 99 mg/dL 93  10/3/19 09:24   Estimated Average Glucose mg/dL 105 (E)  24 07:50   (E): External lab result  Other: [facility] labs  Creatinine 0.97 (2024)  AMH 0.84 (2024)  TSH 2024 2.410 -- Currently on 75 mcg synthroid     Had had STDs done through FDA lab for Viromeds  10/2023 and 3/2024    Status of fallopian tubes: Bilateral tubal patency      Saline Ultrasound: Not assessesd  TVUS and MRI suggestive of adenomyosis     Hysteroscopy:  Not assessed       Partner SA: Normal  Name: Rj Sanderson   :  1987  Occupation:   Prior paternity: No  Pertinent history:   SA in past year: Yes, normal at [facility]    Sperm Concentration  >=15.00 M/mL 45.50   Total Sperm Count  M 259.35   % Motile Sperm (%FL + %NP)  >=40 % 52   Forward Progression 2 = Poor to moderate, erratic   Total Motile Sperm  M 134.86   % Normal Forms, WHO (5th ed.)  >=4 % 4     -Had STD testing through Viromeds 2023- scanned into his chart    Genetic screening Hx  Patient: Myriad 2bP: testing done at [facility] negaitve per patient  Sperm Source: partner  Sperm: Myriad 2bP: Negative per patient  Need [facility] records to review  Need to review [facility] records  Yes pt and partner had carrier screening at [facility] also saw Genetic counselor at [facility] to assess if pt's kidney disease was genetic and all testing was negative per pt   -Update:  Carrier screening  scanned into patients' chart,emeterio oneill)     Treatment to date:   Fertility Cycles       Cycle Name Treatment Start Date Type Outcome    Natural, LP Prometrium/ TIC 09/12/2024 Timed Bos[place] Active          Past Infertility Treatments:  See below  1 Cycle of IVF at [facility]  Natural start  Antagonist, , 5 units low dose hcg   5 oocytes  3 mature  2 fertilized  2 Blasts,   PGT-A   1 monosomy 16  1 monosomy 18 XYY    PMH:  Chronic Kidney Disease   ADHD   IBS  Lisinopril 5 mg PO daily--> Stopped end of July   Synthroid 75 mcg PO daily   Took Growth hormone shots as a child Age 5 until 15 years old   Past Medical History:   Diagnosis Date    Anxiety disorder, unspecified     Anxiety    Infertility, female     Personal history of other diseases of the female genital tract     History of abnormal cervical Papanicolaou smear    Personal history of other specified conditions     History of headache    Raynaud's syndrome without gangrene     Raynauds disease    Renal dysplasia     Cystic dysplasia of one kidney     Past Surgical History:   Procedure Laterality Date    CERVICAL BIOPSY  W/ LOOP ELECTRODE EXCISION  11/09/2016    Cervical Loop Electrosurgical Excision (LEEP)    COLPOSCOPY  11/09/2016    Colposcopy    KIDNEY SURGERY  11/07/2016    Kidney Surgery    OTHER SURGICAL HISTORY  11/07/2016    Nephrectomy    OTHER SURGICAL HISTORY  11/09/2016    Pyeloplasty     Current Outpatient Medications on File Prior to Visit   Medication Sig Dispense Refill    cholecalciferol (Vitamin D-3) 50 MCG (2000 UT) tablet Take 1 tablet (50 mcg) by mouth once daily.      co-enzyme Q-10 30 mg capsule Take 1 capsule (30 mg) by mouth once daily.      levothyroxine (Synthroid, Levoxyl) 75 mcg tablet Take 1 tablet (75 mcg) by mouth early in the morning.. Take on an empty stomach first thing in the morning, nothing to eat or drink besides water for 1 hour, as well as other medications. Take prenatals in the evening. 30 tablet 2    PNV  "no.153/FA/om3/dha/epa/fish (PRENATAL GUMMIES ORAL) Take 4 tablets by mouth once daily.      progesterone (Prometrium) 100 mg capsule Insert 1 capsule (100 mg) into the vagina 2 times a day. Start testing with OPKs on CD 10. You will start Prometrium 4 days after a positive OPK (Patient not taking: Reported on 2024) 60 capsule 0    valACYclovir (Valtrex) 500 mg tablet Take 1 tablet (500 mg) by mouth once daily. (Patient not taking: Reported on 2024)       No current facility-administered medications on file prior to visit.       BMI:   BMI Readings from Last 1 Encounters:   24 29.95 kg/m²     VITALS:  Ht 1.651 m (5' 5\")   Wt 81.6 kg (180 lb)   LMP 2024   BMI 29.95 kg/m²   LMP: Patient's last menstrual period was 2024.    ASSESSMENT   39 y.o.  female with  years, Diminished Ovarian Reserve and Chronic Kidney Disease  and the following pertinent medical issues: CKD, s/p MFM consult- has decided to try to conceive naturally  and has had 1 biochemical pregnancy on first month trying    COUNSELING  Reviewed options for her to conceive with her own eggs.    She is concerned about her age and moving aggressively to fertility treatments    Would like to consider OI/IUI- we discussed twin risk of ~10%; twin risks are particularly severe for her due to her history of kidney disease and LEEP.  Despite this, patient feels strongly she wants to proceed with fertility treatments as quickly as possible.  Given her concerns about timing it is reasonable to proceed with OI/IUI and I would preferentially choose letrozole.     Routine Testing  Fertility Center  STDs Within 1 year   Genetic carrier Waiver/Completed   T&S Within 1 year   AMH Within 1 year   TSH Within 1 year   Rubella/Varicella Within 5 years     BMI Testing  Fertility Center  CBC Within 1 year   CMP Within 1 year   HgbA1c Within 1 year   Mag, Phos, Vit D <18 Within 1 year   MFM > 40  REQ   Wt loss consult > 40 OPT     PLAN  No " orders of the defined types were placed in this encounter.      FOLLOW UP   Consults:  None, already had MFM consult, stopped lisonopril  Engaged MD  Take prenatal vitamins, vitamin D 2000 IUs daily  Discussed that treatment cannot proceed until checklist items are complete.   Additional testing for BMI < 18 or > 40: NA.  Chart to primary nurse for care coordination and patient check list/education.    MD Completion:  Ectopic Risk: No  Medically Complex: Yes  Outstanding boarding pass items: Rj will need to repeat STDs this month  Hysteroscopy prior to FET  Will need updatd viromends/FDA questionnaire/FDA physical prior to IVF as patient wants to make embryos GC eligible with plan for autologous use initially    Fertility Plan Update:  Patient considering options for natural cycle TIC vs. Letrozole/IUI this month  If chooses letrozole: Letrozole 5 mg CD3-7 with office monitoring and hCG trigger for partner IUI, prometrium vaginally for luteal support.   Will likely do this for 1-2 cycles prior to getting set up for another IVF cycle  See prior IVF consult note for IVF plan    Poornima Root 11/05/24 8:41 AM

## 2024-11-05 NOTE — TELEPHONE ENCOUNTER
Returned patient's call.  Patient planning for IUI.   Let patient know her medications-letrozole 5mg and prometrium were sent to her local pharmacy, pregnyl trigger shot sent to [facility].   Infinit message sent with [facility] information for ordering prenyl trigger shot.   She is currently CD 2, advised to start letrozole CD 3-7 after confirming negative UPT.   Will call office tomorrow to schedule for follicle scan, E2, P4 and LH on 11/12.     Inocencia Walters 11/05/24 4:50 PM

## 2024-11-06 ENCOUNTER — SPECIALTY PHARMACY (OUTPATIENT)
Dept: PHARMACY | Facility: CLINIC | Age: 39
End: 2024-11-06

## 2024-11-06 ENCOUNTER — TELEPHONE (OUTPATIENT)
Dept: ENDOCRINOLOGY | Facility: CLINIC | Age: 39
End: 2024-11-06
Payer: COMMERCIAL

## 2024-11-06 PROCEDURE — RXMED WILLOW AMBULATORY MEDICATION CHARGE

## 2024-11-07 ENCOUNTER — APPOINTMENT (OUTPATIENT)
Dept: ENDOCRINOLOGY | Facility: CLINIC | Age: 39
End: 2024-11-07
Payer: COMMERCIAL

## 2024-11-07 ENCOUNTER — PHARMACY VISIT (OUTPATIENT)
Dept: PHARMACY | Facility: CLINIC | Age: 39
End: 2024-11-07
Payer: COMMERCIAL

## 2024-11-12 ENCOUNTER — ANCILLARY PROCEDURE (OUTPATIENT)
Dept: ENDOCRINOLOGY | Facility: CLINIC | Age: 39
End: 2024-11-12
Payer: COMMERCIAL

## 2024-11-12 ENCOUNTER — APPOINTMENT (OUTPATIENT)
Dept: ENDOCRINOLOGY | Facility: CLINIC | Age: 39
End: 2024-11-12
Payer: COMMERCIAL

## 2024-11-12 DIAGNOSIS — N97.9 FEMALE INFERTILITY: ICD-10-CM

## 2024-11-12 LAB
ESTRADIOL SERPL-MCNC: 122 PG/ML
LH SERPL-ACNC: 7 IU/L
PROGEST SERPL-MCNC: 0.7 NG/ML

## 2024-11-12 PROCEDURE — 76857 US EXAM PELVIC LIMITED: CPT

## 2024-11-12 PROCEDURE — 84144 ASSAY OF PROGESTERONE: CPT

## 2024-11-12 PROCEDURE — 82670 ASSAY OF TOTAL ESTRADIOL: CPT

## 2024-11-12 PROCEDURE — 83002 ASSAY OF GONADOTROPIN (LH): CPT

## 2024-11-12 NOTE — PROGRESS NOTES
CYCLING NOTE    Here for US and/or lab monitoring; relevant findings reviewed.    IUI #: 1  Reason For Treatment: Diminished Ovarian Reserve and Chronic Kidney disease   Protocol: Letrozole 5 mg CD 3-7/monitoring/trigger/IUI + Prometrium 100 mg PV BID  Day of cycle: 9  Patient Hx: 39yr old patient of Dr. Root  Notes: Partner STDs (viromeds in media tab)  24; patient aware of this    Patient stayed for nurse visit. Pain is 0/10  Team will contact patient later today with results and plan.    Natacha Chi  2024  8:07 AM    Telephone call made to patient with MD plan. Patient agreeable to RTC Friday. Patient states having trigger shot at home.    24 at 2:24 PM - Natacha Chi RN

## 2024-11-14 ENCOUNTER — ANCILLARY PROCEDURE (OUTPATIENT)
Dept: ENDOCRINOLOGY | Facility: CLINIC | Age: 39
End: 2024-11-14
Payer: COMMERCIAL

## 2024-11-14 DIAGNOSIS — N97.9 FEMALE INFERTILITY: ICD-10-CM

## 2024-11-14 LAB
ESTRADIOL SERPL-MCNC: 217 PG/ML
LH SERPL-ACNC: 28.4 IU/L
PROGEST SERPL-MCNC: 0.9 NG/ML

## 2024-11-14 PROCEDURE — 83002 ASSAY OF GONADOTROPIN (LH): CPT

## 2024-11-14 PROCEDURE — 76857 US EXAM PELVIC LIMITED: CPT | Performed by: STUDENT IN AN ORGANIZED HEALTH CARE EDUCATION/TRAINING PROGRAM

## 2024-11-14 PROCEDURE — 84144 ASSAY OF PROGESTERONE: CPT

## 2024-11-14 PROCEDURE — 82670 ASSAY OF TOTAL ESTRADIOL: CPT

## 2024-11-14 PROCEDURE — 76857 US EXAM PELVIC LIMITED: CPT

## 2024-11-14 NOTE — PROGRESS NOTES
CYCLING NOTE - IUI  Cycle #: 1  Reason For Treatment: ANNI and Chronic Kidney Disease  Protocol: Letrozole 5 mg CD 3-7/monitoring/trigger/IUI + Prometrium 100 mg PV BID   Day of stim: 11  Patient Hx: 39 year old, patient of Dr. Root. AMH = 0.84    Here for US and/or lab monitoring; relevant findings reviewed.    Patient stayed for nurse visit. Pain is 0/10. Patient has Pregnyl trigger shot, and received +OPK today.   Team will contact patient later today with results and plan.    Tawana Chavez  11/14/2024  11:55 AM        HCG Trigger    Trigger type: Inject HCG 10,000 units subcutaneously tonight 11/14  Trigger instructions reviewed.   Patient verbalizes understanding of plan and location of procedure.  Patient to call  to schedule IUI for tomorrow  Patient instructed to call with start of next menses or positive home pregnancy test.     Tawana Chavez  11/14/2024  1:45 PM

## 2024-11-15 ENCOUNTER — APPOINTMENT (OUTPATIENT)
Dept: ENDOCRINOLOGY | Facility: CLINIC | Age: 39
End: 2024-11-15
Payer: COMMERCIAL

## 2024-11-15 ENCOUNTER — PROCEDURE VISIT (OUTPATIENT)
Dept: ENDOCRINOLOGY | Facility: CLINIC | Age: 39
End: 2024-11-15

## 2024-11-15 DIAGNOSIS — Z31.89 ENCOUNTER FOR ARTIFICIAL INSEMINATION: Primary | ICD-10-CM

## 2024-11-15 PROCEDURE — 58322 ARTIFICIAL INSEMINATION: CPT

## 2024-11-15 PROCEDURE — 89261 SPERM ISOLATION COMPLEX: CPT | Performed by: STUDENT IN AN ORGANIZED HEALTH CARE EDUCATION/TRAINING PROGRAM

## 2024-11-15 NOTE — PROGRESS NOTES
"Patient ID: Marcy Putnam is a 39 y.o. female.    Intrauterine Insemination (IUI)    Date/Time: 11/15/2024 11:17 AM    Performed by: KIERSTEN Carrillo  Authorized by: KIERSTEN Rutherford    Consent:     Consent obtained:  Verbal and written    Consent given by:  Patient    Procedure risks and benefits discussed: yes      Patient questions answered: yes      Patient agrees, verbalizes understanding, and wants to proceed: yes      Educational handouts given: yes      Instructions and paperwork completed: yes    Procedure:     Pelvic exam performed: yes      Speculum placed in vagina: yes      Tenaculum applied to cervix: no      Type of insemination: intrauterine insemination      Ultrasound guidance: No      Speculum type: Tiesha      Catheter type: curved      Curvature: mild      Difficulty: easy      Estimated Blood Loss:  None    Specimen Return: none    Post-procedure:     Patient tolerated procedure well: yes      Post-procedure plan: patient counseled on signs and symptoms for which to call and/or return to clinic    Comments:     Procedure comments:  IUI Procedure note:    The patient presented today for IUI.     Consent signed by patient, IUI sample identified by patient, and Final Verification performed with patient via electronic system \"\" prior to insemination.   All patient's questions were discussed and answered.          Chaperone offered to patient for intimate exam: Patient declined chaperone  Name of chaperone: N/A    Specimen Source: Partner  Specimen Condition: Fresh  TMS 17.68 million, motility 85%    Additional notes:    Patient was advised to call office if she develops fever, chills, pelvic pain, or heavy bleeding.    Progesterone and post-IUI teaching completed. Will start Progesterone three days after IUI and continue twice daily. Pt will do a home pregnancy test two weeks from IUI date. If home pregnancy test positive, patient will continue Progesterone and call office " to schedule BHCG. If home pregnancy test negative, patient will discontinue Progesterone, and was advised to call office with start of menses; will proceed with another cycle if appropriate.  Patient verbalized understanding of plan.    Magali Cárdenas CNP 11/15/24 11:48 AM

## 2024-12-03 ENCOUNTER — TELEPHONE (OUTPATIENT)
Dept: ENDOCRINOLOGY | Facility: CLINIC | Age: 39
End: 2024-12-03
Payer: COMMERCIAL

## 2024-12-03 DIAGNOSIS — Z31.41 FERTILITY TESTING: ICD-10-CM

## 2024-12-03 DIAGNOSIS — Z01.812 ENCOUNTER FOR PREPROCEDURAL LABORATORY EXAMINATION: ICD-10-CM

## 2024-12-03 NOTE — PROGRESS NOTES
Boarding Pass Intended Parent for Future Gestational Carrier (IP) Checklist    Age: 39 y.o.    Provider: Poornima Root MD  Primary RN: Shanae Araujo  Reasons for Treatment: Indication for Use of Gestational Carrier Diminished Ovarian Reserve and Chronic Kidney disease  Gestational Carrier: Marcy Putnam; MRN: [id number]; 38 yo. (Autologous Egg & Carrier)  Last BMI  24 : 30.67 kg/m²       Past Medical History:   Diagnosis Date    Anxiety disorder, unspecified     Anxiety    Infertility, female     Personal history of other diseases of the female genital tract     History of abnormal cervical Papanicolaou smear    Personal history of other specified conditions     History of headache    Raynaud's syndrome without gangrene     Raynauds disease    Renal dysplasia     Cystic dysplasia of one kidney       Date Done Consultation Results/Comments   5/10/24 Medication Protocol Lead in: estrace  Fertility Plan Update: Antagonist /Menopur 150, Clomid first 5 days of stim (Clomid flare), +HGH   Trigger plan: HCG vs Lupron, consider co-trigger   Pre-retrieval meds: Antibiotics per protocol  Adjuncts: HGH  Notes: Will need updatd viromends/FDA questionnaire/FDA physical prior to IVF as patient wants to make embryos GC eligible with plan for autologous use initially       25: IVF cycle #3  Fertility Plan Update:  Patient will baseline tomorrow for Clomid mini-stim protocol.  Luteal estrace Clomid mini stim  Clomid 100 mg CD3-7 with  overlap last 2 days  Add menopur with antagonist or as needed  +HGH throughout stim  NOTE: Patient ovulated prematurely last cycle.  Recommend monitoring LH levels at EACH visit.  Start antagonist when E2>300 or follicle >12 mm. Close monitoring at end of stim (daily or every other day- do not let her monitor longer than every other day).  -Plan ICSI with partner FDA tested sperm- Freeze all with PGT-A for possible future gestational carrier   5/10/24 IVF  Consult IVF Consult: Yes  PGT-A/M? Yes; Req Sent: Yes; PGT-M Test Ready: No n/a; Company: [employer]  SENT 7/31/24 6/28/24 Consult r/t 3rd party      Waiver (In) Form Enroll in EngagedMD: Yes (Shanae Araujo RN)  Received and in chart: n/a at this time   12/12/24  Waiver (Out) Form Enroll in EngagedMD: Yes (Shanae Araujo RN)  Received and in chart: Yes (Shanae Araujo RN)   12/12/24 IVF Information and Authorization (to be completed annually) Received and in chart: Yes (Shanae Araujo RN)    FET Treatment Plan Received and in chart: N/A at this time   12/27/24 [employer] Packet [x]     7/2024 Procedure Order Placed  [x]     5/31/24 Psych Consult Okay to proceed? Yes    Legal For future carrier   N/A Genetics Consult Okay to proceed? N/A    Other    Date Done Female Labs Results/Comments    ViroMeds To be completed at baseline   2/22/24  (Scanned in Media in records) Hep B sAg NEGATIVE   2/22/24  (Scanned in Media in records) Hep C AB NEGATIVE   2/22/24  (Scanned in Media in records) HIV NEGATIVE   2/22/24  (Scanned in Media in records) Syphilis NEGATIVE   2/22/24  (Scanned in Media in records) GC/CT NEGATIVE   10/30/23  Rubella (Q 5 Years) IMMUNE   10/30/23  Varicella (Q 5 Years) IMMUNE   4/23/24 TSH 2.4   4/23/24 HgbA1C 5.3 (Ref range: 4.3 - 5.6 %)   2/27/24  (Media) T&S (Q 1 Year) ABO: A  Rh: POS  Antibody: NEG   4/23/24 AMH 0.84   10/7/2024 TSH 2.53 (Ref range: 0.44 - 3.98 mIU/L)   2022 Carrier Screening Myriad 2bP: Completed previously at [facility]  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    Hysteroscopy:   Findings:   Cavity: Smooth cavity no lesions noted; cavity was noted to be narrow;  endometrium healthy in appearance  Ostia: Bilateral tubal ostia visualized  Additional Notes: Patient with difficulty tolerating procedure from a comfort standpoint, but was able to complete the procedure     Jia Quiroz 12/12/24 3:03 PM    Pelvic US: 2023 4/24/24 Pap Smear Negative for intraepithelial lesion or malignancy   HPV: NEGATIVE    Mammogram N/A   2024/2025 FDA Workup FDA Physical: completed; Date: 12/4/24 (within 6 mo)  FDA Questionnaire: completed; Date: 12/26/24 (within 6 mo)  Viromeds: to be completed at baseline  Summary of Records: to be completed after baseline when viromeds are completed    Donor Number Add to spreadsheet [id number]  Egg Bank: N/A  Agency:  future GC   Date Done Male Labs (skip if using sperm donor)   Results/Comments  ROBERT THOMPSON (MRN: [id number])   12/30/24 ViroMeds Completed on 12/30 2022 Genetic Carrier Screening Myriad- completed at [facility]  Pos Biotinidase Deficiency   12/31/24 Sperm Freeze  # of vials: 5   **WILL USE FROZEN VIALS FOR FERT  TMS post thaw: 8.5 miml/vial   12/2024 FDA Workup FDA Physical: completed; Date: 12/6/24 (within 6 mo)  FDA Questionnaire: completed; Date: 12/26/24 (within 6 mo)  Viromeds: results verified; Date: 12/31/24 (within 7 d of sperm collection)  Summary of Records: provider sign off    Donor Number Add to spreadsheet [id number]    Fertilization Plan Frozen     MD Completion:  Ectopic Risk: N/A  Medically Complex: Yes    Reviewed and approved to proceed  Patient's summary of records will be confirmed after viromeds completed at baseline  Poornima Root 01/06/25 3:47 PM    Reviewed and approved to proceed with IVF #2  Patient's summary of records will be confirmed after viromeds completed (drawn today at baseline)  Poornima Root 02/05/25 3:23 PM

## 2024-12-03 NOTE — TELEPHONE ENCOUNTER
Called patient to touch base- patient expecting period within a day or two, is considering another letrozole IUI cycle but also wants to do IVF in January.   Partner needs to update STD Panel for IUI, could use viromeds as updated STD panel but needs to be done with the freeze. Ideally want FDA Physical done prior to doing freeze and viromeds. Patient is going to come in tomorrow at 9am for FDA physical and is going to see if her  can come in at 0830am to get that done so we can schedule viromeds and freeze (and to be done prior to IUI).  Patient aware she should be able to start her estrace the same cycle as the IUI and would just start it with the prometrium, will confirm with Dr. Root. Pt asking if her hysteroscopy is not covered if she can just wait until after the retrieval and wait until she knows she has embryos since she did not get any embryos last time. Will confirm with Dr. Root, should be fine- will ask the financial team to see if they can see if it is covered. Also aware we may not recommend doing letrozole and hysteroscopy same cycle.   Will update us with her period start and will try to get Rj in for FDA physical ASAP.    Shanae Araujo 12/03/24 4:50 PM

## 2024-12-04 ENCOUNTER — TELEPHONE (OUTPATIENT)
Dept: ENDOCRINOLOGY | Facility: CLINIC | Age: 39
End: 2024-12-04

## 2024-12-04 ENCOUNTER — OFFICE VISIT (OUTPATIENT)
Dept: ENDOCRINOLOGY | Facility: CLINIC | Age: 39
End: 2024-12-04
Payer: COMMERCIAL

## 2024-12-04 VITALS
HEART RATE: 65 BPM | HEIGHT: 65 IN | DIASTOLIC BLOOD PRESSURE: 80 MMHG | TEMPERATURE: 97.5 F | BODY MASS INDEX: 30.49 KG/M2 | WEIGHT: 183 LBS | SYSTOLIC BLOOD PRESSURE: 128 MMHG

## 2024-12-04 DIAGNOSIS — Z00.00 PHYSICAL EXAM: Primary | ICD-10-CM

## 2024-12-04 PROCEDURE — 99213 OFFICE O/P EST LOW 20 MIN: CPT | Performed by: NURSE PRACTITIONER

## 2024-12-04 ASSESSMENT — PAIN SCALES - GENERAL: PAINLEVEL_OUTOF10: 1

## 2024-12-04 NOTE — PROGRESS NOTES
"**Need FULL set of Vitals including BP, HR, RR, and Temperature**    Vitals: Temp: 36.4  /80  P: 65  R: 16    Physical Exam  General: well  Heart: regular rhythm without murmur  Lungs: clear  Abdomen: normal    Pelvic:  EGBUS Normal   Vagina: Normal   Cervix: Normal   Uterus: Midline, Normal size   Adnexa: Negative    Specfic assessments for oocyte or sperm donors:  Is there any evidence of the following:  Comment if any are \"Yes\"   Recent tattoo: No  **if recent tattoo notate if under sterile technique, if within the past year we will need copy of [employer] license  Recent body piercing: No   **if recent piercing notate if under sterile technique, if within the past year we will need copy of [employer] license  Poor basic hygiene:  No  Genital lesions: No   Insertion trauma: No   Genital / perianal warts: No   Other physical evidence of sexually transmitted disease: No  Non medical injection sites: No   Home produced tattoo: No  Enlarged lymph nodes: No   Oral thrush: No   Blue purple spots / lesions: No  Trauma / infection: No   Sepsis / rash: No  Jaundice / icterus: No  Enlarged liver: No  Scabs / small pox: No  Eczema vaccinatum:   No  Vaccinia necrosum: No   Vaccinia keratitis: No  Rashes: No  Speculum exam completed to assess for cervicitis, genital ulcerative disease, herpes simplex, genital warts, or chancroid. Evidence of disease : No     Maria Elena Monroe 12/04/24 10:02 AM    "

## 2024-12-04 NOTE — TELEPHONE ENCOUNTER
----- Message from Poornima Root sent at 12/4/2024  9:20 AM EST -----  Regarding: RE: another letrozole cycle  Yes and she can do letrozole same cycle as hysteroscopy if timing works out (just like HSG); okay to wait for hysteroscopy until prior to transfer  Okay to start estrace with prometrium  ----- Message -----  From: Shanae Araujo RN  Sent: 12/3/2024   4:44 PM EST  To: Poornima Root MD  Subject: another letrozole cycle                          Pt is expecting her period within a day or two, she is wanting to do IVF in January. She was hoping to try letrozole IUI again this month while waiting,   1. I didn't think we do hysteroscopies the same month as the letrozole (she said if it is not covered by insurance if she can wait to do this until she knows she has confirmed embryos since she did not get any last time and doesn't want to pay for  it and didn't need it)  2. Ok for her to start luteal estrace after her IUI with her prometrium for a lead in for her IVF cycle?      Thanks!

## 2024-12-06 DIAGNOSIS — N97.9 FEMALE INFERTILITY: ICD-10-CM

## 2024-12-06 RX ORDER — LETROZOLE 2.5 MG/1
5 TABLET, FILM COATED ORAL DAILY
Qty: 10 TABLET | Refills: 0 | Status: SHIPPED | OUTPATIENT
Start: 2024-12-06 | End: 2025-03-06

## 2024-12-06 RX ORDER — CHORIONIC GONADOTROPIN 10000 UNIT
10000 KIT INTRAMUSCULAR ONCE AS NEEDED
Qty: 1 EACH | Refills: 0 | Status: SHIPPED | OUTPATIENT
Start: 2024-12-06

## 2024-12-06 NOTE — PROGRESS NOTES
Patient called with menses for TIC cycle  Cycle #:  2 total (first cycle was IUI, patient wants to do monitoring TIC this cycle)  Medication: Letrozole  Ovulation: Trigger  Sperm Source:   TIC  Approved donor sperm number: n/a  Luteal Support: Prometrium and Estrace (ESTRACE FOR LEADING INTO IVF CYCLE)  Additional Medications:  none  Boarding Pass signed off: addendum sent to Magali Cárdenas  IUI order pended: n/a  Additional Information: pt wants to do TIC this cycle instead of IUI to save money for IVF. Will still plan monitoring, trigger for timed intercourse, would start prometrium and estrace in the luteal phase for lead into ivf cycle if not pregnant.     Could come in for monitoring either CD 8 on 12/13 or CD 11 on 12/16, aware CD 8 is likely very early but CD 11 she triggered last time and starting to surge.     Shanae Araujo 12/06/24 3:42 PM

## 2024-12-09 ENCOUNTER — PREP FOR PROCEDURE (OUTPATIENT)
Dept: ENDOCRINOLOGY | Facility: CLINIC | Age: 39
End: 2024-12-09
Payer: COMMERCIAL

## 2024-12-09 RX ORDER — ACETAMINOPHEN 325 MG/1
650 TABLET ORAL ONCE AS NEEDED
Status: CANCELLED | OUTPATIENT
Start: 2024-12-09

## 2024-12-09 RX ORDER — KETOROLAC TROMETHAMINE 30 MG/ML
30 INJECTION, SOLUTION INTRAMUSCULAR; INTRAVENOUS ONCE AS NEEDED
Status: CANCELLED | OUTPATIENT
Start: 2024-12-09 | End: 2024-12-14

## 2024-12-10 DIAGNOSIS — N97.9 FEMALE INFERTILITY: ICD-10-CM

## 2024-12-11 RX ORDER — GANIRELIX ACETATE 250 UG/.5ML
250 INJECTION, SOLUTION SUBCUTANEOUS EVERY MORNING
Qty: 3 EACH | Refills: 2 | Status: SHIPPED | OUTPATIENT
Start: 2024-12-11

## 2024-12-11 RX ORDER — CHORIONIC GONADOTROPIN 10000 UNIT
10000 KIT INTRAMUSCULAR ONCE AS NEEDED
Qty: 1 EACH | Refills: 0 | Status: SHIPPED | OUTPATIENT
Start: 2024-12-11

## 2024-12-11 RX ORDER — CLOMIPHENE CITRATE 50 MG/1
100 TABLET ORAL DAILY
Qty: 10 TABLET | Refills: 0 | Status: SHIPPED | OUTPATIENT
Start: 2024-12-11 | End: 2024-12-16

## 2024-12-12 ENCOUNTER — HOSPITAL ENCOUNTER (OUTPATIENT)
Dept: ENDOCRINOLOGY | Facility: CLINIC | Age: 39
Discharge: HOME | End: 2024-12-12
Payer: COMMERCIAL

## 2024-12-12 ENCOUNTER — PREP FOR PROCEDURE (OUTPATIENT)
Dept: ENDOCRINOLOGY | Facility: CLINIC | Age: 39
End: 2024-12-12
Payer: COMMERCIAL

## 2024-12-12 VITALS
SYSTOLIC BLOOD PRESSURE: 149 MMHG | DIASTOLIC BLOOD PRESSURE: 83 MMHG | TEMPERATURE: 97.5 F | OXYGEN SATURATION: 100 % | HEART RATE: 78 BPM | WEIGHT: 184.3 LBS | RESPIRATION RATE: 16 BRPM | HEIGHT: 65 IN | BODY MASS INDEX: 30.71 KG/M2

## 2024-12-12 DIAGNOSIS — Z31.41 FERTILITY TESTING: ICD-10-CM

## 2024-12-12 LAB — PREGNANCY TEST URINE, POC: NEGATIVE

## 2024-12-12 PROCEDURE — 7100000010 HC PHASE TWO TIME - EACH INCREMENTAL 1 MINUTE

## 2024-12-12 PROCEDURE — RXMED WILLOW AMBULATORY MEDICATION CHARGE

## 2024-12-12 PROCEDURE — 7100000009 HC PHASE TWO TIME - INITIAL BASE CHARGE

## 2024-12-12 PROCEDURE — 64435 NJX AA&/STRD PARACRV NRV: CPT | Performed by: STUDENT IN AN ORGANIZED HEALTH CARE EDUCATION/TRAINING PROGRAM

## 2024-12-12 PROCEDURE — 58555 HYSTEROSCOPY DX SEP PROC: CPT | Performed by: STUDENT IN AN ORGANIZED HEALTH CARE EDUCATION/TRAINING PROGRAM

## 2024-12-12 RX ORDER — ACETAMINOPHEN 325 MG/1
650 TABLET ORAL ONCE AS NEEDED
Status: DISCONTINUED | OUTPATIENT
Start: 2024-12-12 | End: 2024-12-13 | Stop reason: HOSPADM

## 2024-12-12 RX ORDER — KETOROLAC TROMETHAMINE 30 MG/ML
30 INJECTION, SOLUTION INTRAMUSCULAR; INTRAVENOUS ONCE AS NEEDED
Status: DISCONTINUED | OUTPATIENT
Start: 2024-12-12 | End: 2024-12-13 | Stop reason: HOSPADM

## 2024-12-12 NOTE — PROGRESS NOTES
Patient ID: Marcy Putnam is a 39 y.o. female.    Hysteroscopy diagnostic    Date/Time: 12/12/2024 2:52 PM    Performed by: Jia Quiroz MD  Authorized by: Poornima Root MD    Consent:     Consent obtained:  Verbal and written    Consent given by:  Patient    Risks, benefits, and alternatives were discussed: yes      Risks discussed:  Bleeding, infection and pain  Universal protocol:     Procedure explained and questions answered to patient or proxy's satisfaction: yes      Relevant documents present and verified: yes      Test results available: yes      Imaging studies available: yes      Required blood products, implants, devices, and special equipment available: yes      Immediately prior to procedure, a time out was called: yes      Patient identity confirmed:  Verbally with patient, arm band and hospital-assigned identification number  Pre-procedure details:     Skin preparation:  Povidone-iodine  Procedure specific details:      Procedure: Diagnostic Hysteroscopy   Preop diagnosis: IVF  Post op diagnosis: Same   Assistant: RAJNI Malloy    Anesthesia: None   IV: None   EBL: 3 cc  Specimens: None   Complications: None   Risks benefits and alternatives of the procedure explained to the patient and informed consent was obtained. Urine pregnancy test was performed and was negative. Time out was performed. The patient was placed in the dorsal lithotomy position and a sterile speculum was placed in the vagina. The cervix was sterilized with Betadine x3. Paracervical block with lidocaine 1% was administered.   Tenaculum: No  Dilation: No  The hysteroscope was placed in the cervix and advanced into the uterine cavity. Normal saline was used for distension media. Images were obtained and findings noted as below.   All instruments were then removed. Good hemostasis was noted. Patient tolerated the procedure well returned to the recovery area in stable condition. .   Findings:   Cavity: Smooth cavity no  lesions noted; cavity was noted to be narrow; endometrium healthy in appearance  Ostia: Bilateral tubal ostia visualized  Additional Notes: Patient with difficulty tolerating procedure from a comfort standpoint, but was able to complete the procedure    Jia Quiroz 12/12/24 3:03 PM             Post-procedure details:     Procedure completion:  Tolerated well, no immediate complications

## 2024-12-13 ENCOUNTER — PHARMACY VISIT (OUTPATIENT)
Dept: PHARMACY | Facility: CLINIC | Age: 39
End: 2024-12-13
Payer: COMMERCIAL

## 2024-12-16 ENCOUNTER — ANCILLARY PROCEDURE (OUTPATIENT)
Dept: ENDOCRINOLOGY | Facility: CLINIC | Age: 39
End: 2024-12-16
Payer: COMMERCIAL

## 2024-12-16 DIAGNOSIS — N97.9 FEMALE INFERTILITY: ICD-10-CM

## 2024-12-16 LAB
ESTRADIOL SERPL-MCNC: 75 PG/ML
LH SERPL-ACNC: 31.6 IU/L

## 2024-12-16 PROCEDURE — 83002 ASSAY OF GONADOTROPIN (LH): CPT

## 2024-12-16 PROCEDURE — 76857 US EXAM PELVIC LIMITED: CPT

## 2024-12-16 PROCEDURE — 82670 ASSAY OF TOTAL ESTRADIOL: CPT

## 2024-12-16 PROCEDURE — 76857 US EXAM PELVIC LIMITED: CPT | Performed by: OBSTETRICS & GYNECOLOGY

## 2024-12-16 NOTE — PROGRESS NOTES
CYCLING NOTE- Letrozole Monitoring/trigger for TIC (saving money for IVF)    39 years old, Dr. Root patient, AMH 0.84    Here for US and/or lab monitoring; relevant findings reviewed.    Patient did not stay for discussion after monitoring,  Team will contact patient later today with results and plan.    Planning to start prometrium AND estrace in  luteal phase as lead in for IVF as she is planning on doing IVF if not pregnant with this cycle.    Shanae Araujo  12/16/2024  8:25 AM    MyChart to patient with plan, will trigger today for intercourse today, and then next few days, has SF Friday so may want to hold off on intercourse Thursday. Pt to start 100mg Prometrium PV BID on Saturday as well as 2mg oral estrace BID and will test for pregnancy around 12/31 or 1/1 and will stop Prometrium if not pregnant and continue estrace for IVF lead in.  Still need to complete FDA questionnaires and partner needs to sign [employer] packet.  Shanae Araujo 12/16/24 12:25 PM

## 2024-12-23 DIAGNOSIS — N97.9 FEMALE INFERTILITY: ICD-10-CM

## 2024-12-23 RX ORDER — ESTRADIOL 2 MG/1
2 TABLET ORAL 2 TIMES DAILY
Qty: 60 TABLET | Refills: 0 | Status: SHIPPED | OUTPATIENT
Start: 2024-12-23 | End: 2025-12-23

## 2024-12-27 PROCEDURE — RXMED WILLOW AMBULATORY MEDICATION CHARGE

## 2025-01-03 DIAGNOSIS — E03.8 OTHER SPECIFIED HYPOTHYROIDISM: ICD-10-CM

## 2025-01-06 ENCOUNTER — SPECIALTY PHARMACY (OUTPATIENT)
Dept: PHARMACY | Facility: CLINIC | Age: 40
End: 2025-01-06

## 2025-01-06 ENCOUNTER — PHARMACY VISIT (OUTPATIENT)
Dept: PHARMACY | Facility: CLINIC | Age: 40
End: 2025-01-06
Payer: COMMERCIAL

## 2025-01-06 ENCOUNTER — TELEPHONE (OUTPATIENT)
Dept: ENDOCRINOLOGY | Facility: CLINIC | Age: 40
End: 2025-01-06
Payer: COMMERCIAL

## 2025-01-06 RX ORDER — LEVOTHYROXINE SODIUM 75 UG/1
75 TABLET ORAL DAILY
Qty: 30 TABLET | Refills: 2 | Status: SHIPPED | OUTPATIENT
Start: 2025-01-06

## 2025-01-06 NOTE — TELEPHONE ENCOUNTER
Pt LMP 1/6, will schedule baseline for tomorrow, E2, CBC and VIROMEDS.  Will sign off BP today and partner summary of records.  Shanae Araujo 01/06/25 11:37 AM

## 2025-01-07 ENCOUNTER — ANCILLARY PROCEDURE (OUTPATIENT)
Dept: ENDOCRINOLOGY | Facility: CLINIC | Age: 40
End: 2025-01-07
Payer: COMMERCIAL

## 2025-01-07 ENCOUNTER — SPECIALTY PHARMACY (OUTPATIENT)
Dept: PHARMACY | Facility: CLINIC | Age: 40
End: 2025-01-07

## 2025-01-07 DIAGNOSIS — Z01.812 ENCOUNTER FOR PREPROCEDURAL LABORATORY EXAMINATION: ICD-10-CM

## 2025-01-07 DIAGNOSIS — N97.9 FEMALE INFERTILITY: ICD-10-CM

## 2025-01-07 LAB
ERYTHROCYTE [DISTWIDTH] IN BLOOD BY AUTOMATED COUNT: 11.7 % (ref 11.5–14.5)
ESTRADIOL SERPL-MCNC: 428 PG/ML
HCT VFR BLD AUTO: 44.9 % (ref 36–46)
HGB BLD-MCNC: 15.2 G/DL (ref 12–16)
MCH RBC QN AUTO: 28.6 PG (ref 26–34)
MCHC RBC AUTO-ENTMCNC: 33.9 G/DL (ref 32–36)
MCV RBC AUTO: 85 FL (ref 80–100)
NRBC BLD-RTO: 0 /100 WBCS (ref 0–0)
PLATELET # BLD AUTO: 324 X10*3/UL (ref 150–450)
RBC # BLD AUTO: 5.31 X10*6/UL (ref 4–5.2)
WBC # BLD AUTO: 9.7 X10*3/UL (ref 4.4–11.3)

## 2025-01-07 PROCEDURE — 76857 US EXAM PELVIC LIMITED: CPT | Performed by: OBSTETRICS & GYNECOLOGY

## 2025-01-07 PROCEDURE — 85027 COMPLETE CBC AUTOMATED: CPT

## 2025-01-07 PROCEDURE — 76857 US EXAM PELVIC LIMITED: CPT

## 2025-01-07 PROCEDURE — 82670 ASSAY OF TOTAL ESTRADIOL: CPT

## 2025-01-07 NOTE — PROGRESS NOTES
"[facility] Clinical Note  Initial Patient Education- Fertility    Introduction  Marcy Putnam is a 39 y.o. female who is on the specialty pharmacy service for management of: Fertility Core.    Marcy Putnam is initiating the following therapy. Dose and directions will be documented below for each medication.   Gonal-f 900 unit Redi-Ject pen, Menopur 75 unit vials, Ganirelix 250mcg syringes, Omnitrope 5.8mg vials, and Pregnyl 10,000IU vial for trigger     Medication receipt date: 1/7/25  Duration of therapy: Patient/Prescriber Specific- TBD    The most recent IVF baseline note with the referring prescriber Dr. Root on 12/3/24 was reviewed.  Pharmacy will continue to collaborate in the care of this patient with the referring prescriber.    Medications should only be prescribed by fertility specialists for this indication.    Clinical Background  An initial assessment was conducted prior to first fill of the medication to determine the appropriateness of therapy given the patient's diagnosis, medication list, comorbidities, allergies, medical history, patient's ability to self administer medication, and therapeutic goals based on possible outcomes of therapy. Refer to initial assessment task completed on 12/11/24.    Labs for clinical appropriateness that were reviewed include:   Fertility - Estradiol level:   Lab Results   Component Value Date    ESTRADIOL 428 01/07/2025   , Progesterone Level:   Lab Results   Component Value Date    PROGESTERONE 0.9 11/14/2024   , AMH: No results found for: \"AMH\", LH:   Lab Results   Component Value Date    LH 31.6 12/16/2024   , and BMI: There is no height or weight on file to calculate BMI.    Education/Discussion  Marcy was contacted on 1/7/2025 at 3:20 PM for a pharmacy visit with encounter number [id number] from:   [facility]  [address] [place] [id number]  Dept: [phone]  Dept Fax: " [phone]  Marcy consented to a/an Telephone visit, which was performed.    Medication Start Date (planned or actual): 1/7/25  Education was conducted prior to start of therapy? Yes    Education discussed includes the following:     Additional details of the medication specific counseling are found within the linked patient education flowsheet.     The follow up timeline was discussed. Every person responds to and reacts to therapy differently. Patient should be assessed for efficacy and tolerability in approximately:     Every person responds and reacts to therapy differently. Take as directed by your provider.  Adverse effects or efficacy to treatment can occur at any point during therapy.  Recommended contacting the  Fertility Center after starting therapy and sooner if symptoms worsen or new symptoms develop.     Patient was encouraged to reach out to their doctor's office if they develop signs of an allergic reaction to any medication prescribed as part of their fertility treatment.    Provided education on goals and possible outcomes of therapy:  Adherence with therapy  Timely completion of appropriate labs  Timely and appropriate follow up with provider  Identify and address medication interactions with presciption medications, OTC medications and supplements  Optimize or maintain quality of life  Fertility- Female: Successful administration of IVF Medication(s)  Ultimate goal of maintaining a viable pregnancy and delivery of a healthy baby    The importance of adherence was discussed and they were advised to take the medication as prescribed by their provider.     Patient is being seen by the  Fertility Clinic and is undergoing IVF treatment. Specific education for the following medications was completed:    [facility]  Patient Management Program- Initial Patient Education Gonal-F/Follitropin [place]     Clinical Intervention: Gonal-F Redi-Ject Pen Initial Patient  Education    Indication:  Gonal-F is a recombinant human follicle stimulating hormone (FSH) being used to stimulate ovarian follicular growth.      Dose:   Inject 250 units once daily.   Given in the evening.    *There is about  units of medication overfill per pen. Make sure you are utilizing ALL medication prior to discarding.    Administration:   SubQ (BMI < 40): Prepare Gonal-F pen with a new BD micro-fine needle then dial the knob to the correct dose. Inject pen in the lower abdomen (avoiding areas within 2 inches of navel); rotate injection sites.    Warnings, Precautions, and Adverse Reactions:   - Discussed common adverse effects, warnings and precautions pertinent to the medication including but not limited to: headache, fatigue, and nausea.   - Some patients may experience injection site reactions, such as pain, swelling, inflammation, or bruising.  - General side effects as the ovaries grow in size include but are not limited to: bloating, abdominal fullness, and a wide range of emotions.  - Patient was encouraged to reach out to their doctor's office if they develop signs of ovarian hyperstimulation syndrome (OHSS) which is discussed during encounter with provider based on patient specific risk factors.    Handling and Storage   Prior to dispensing, store refrigerated at 36°F to 46°F.   After dispensed, may be stored under refrigeration at 36°F to 46°F until expiration date or at room temperature (77°F) for up to 3 months.   Once cartridge is pierced, can be stored at 36°F to 77°F and used within 28 days.   Do not freeze. Protect from light.    Reproductive Considerations   Use is contraindicated in pregnant patients.    Disposal:  Unused,  or damaged pens should be properly disposed in sharps container.  All needles should be disposed of in sharps container once used.  Do not discard in trash.        [facility]  Patient Management Program- Initial Patient  Education: Menopur/Menotropins    Clinical Intervention: Menopur Initial Patient Education    Indication:  Menopur is a purified combination of follicle stimulating hormone (FSH) and luteinizing hormone (LH) extracted from the urine of postmenopausal women. Treatment provides ovarian follicular growth and maturation.     Dose:   Reconstitute and inject 150 units daily.     Administration:   SubQ (BMI < 40): Reconstitute using 1 mL diluent and inject syringe in the lower abdomen using a 27 G ½ inch needle on a 3mL syringe  (avoiding areas within 2 inches of navel). Rotate injection sites.    Warnings, Precautions, and Adverse Reactions:   - Discussed common adverse effects, warnings and precautions pertinent to the medication including but not limited to: a burning sensation when the medication is administered, headache, fatigue, and nausea.   - Some patients may experience injection site reactions, such as pain, swelling, inflammation, or bruising along with a burning/stinging sensation with injection.  - General side effects as the ovaries grow in size include but are not limited to: bloating, abdominal fullness, and a wide range of emotions.  - Patient was encouraged to reach out to their doctor's office if they develop: signs of ovarian hyperstimulation syndrome (OHSS) which is discussed during encounter with provider based on patient specific risk factors.      Handling and Storage   Store at room temperature (36°F to 77°F). Protect from sunlight. Use immediately after reconstitution/mixing. Hazardous handling considerations should be observed.    Reproductive Considerations   Menotropins are used for the induction of ovulation and assisted reproductive technology (ART). Pregnancy should be excluded prior to treatment.    Disposal:  Unused,  or damaged vials should be properly disposed in sharps container.  All needles and syringes should be disposed of in sharps container once used.  Do not discard in  trash.        [facility]  Patient Management Program- Initial Patient Education:  Omnitrope/Human Growth Hormone    Clinical Intervention: Omnitrope Initial Patient Education    Indication:  Used off-label for patients who have undergone multiple unsuccessful cycles, have had poor responses in the past to stimulation, or suffer with poor oocyte quality.     Dose:   Draw up 1.14 mL of Bacteriostatic Water and inject into Omnitrope vial. Using an insulin syringe, draw up 25 units and inject under the skin once daily as directed per provider.    Administration (SubQ):   Inject under the skin in the lower abdomen (avoiding areas within 2 inches of navel). Rotate injection sites.   22 gauge x 1” or 1 ½” needle 3 mL syringe - to dilute Omnitrope  BD insulin syringe microfine 28 gauge x 1/2” 0.5ml - to administer Omnitrope    Warnings, Precautions, and Adverse Reactions:   - Discussed common adverse effects, warnings and precautions pertinent to the medication including but not limited to: some patients may experience headache, nausea, and abdominal discomfort.   - Some patients may experience injection site reactions, such as pain, swelling, inflammation, or bruising.    Handling and Storage   Store in the refrigerator 36°F to 46°F BEFORE AND AFTER RECONSTITUTION. Do not freeze. Protect from light.    Reproductive Considerations   Adequate use prior to conception may improve fertility in females with hypopituitarism. Data during pregnancy is limited.    Disposal:  Unused,  or damaged vials should be properly disposed in sharps container.  All needles and cartridges should be disposed of in sharps container once used.  Do not discard in trash.      Impression/Plan         Provided contact information ([phone]) for [facility] and reviewed dispensing process, refill timeline, and patient management follow up. Advised to contact the pharmacy if there are any  adverse effects and/or changes to medication list, including prescriptions, OTC medications, herbal products, or supplements. Confirmed understanding of education conducted during assessment. All questions and concerns were addressed and patient was encouraged to reach out for additional questions or concerns.    Sent to patient:   Below is the link for the [facility] Welcome Packet for your reference:    [email or web address]        Ania Pérez (Katie), PharmD  [facility]  Clinical Pharmacy Specialist- Fertility   [facility], Cici Graham  [address] [place] [address]  Email: [email or web address]  Tel: [phone]       Fax: [phone]

## 2025-01-07 NOTE — PROGRESS NOTES
"DEANN NOTE - IVF STIM BASELINE  Patient presents for baseline ultrasound and/or labs.    Treatment protocol: Antagonist /Menopur 150, Clomid first 5 days of stim (Clomid flare), +HGH   Trigger plan: HCG vs Lupron, consider co-trigger   Pre-retrieval meds: Antibiotics per protocol  Adjuncts: HGH  Lead in: Estrace  Start date for lead in: 12/21/24  Last estrace/OCP date: 1/6/25 AM  PGT-A/M? Yes; Req Sent: Yes; PGT-M Test Ready: No n/a; Company: [employer]  PGT order scanned into Epic, confirmed by: Georgina Golden RN on 7/31/2024  Plan to freeze: embryos    [employer] forms/out waiver complete:  Yes    Does patient have medications onhand?  Yes, being delivered today  Pharmacy: [facility]    Boarding pass signed off:  Yes    CBC reviewed by MD?  No in process    Date of Female STDs: 2/22/24. 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  No    SPERM:  partner  Frozen  FDA eligible sample  Number of vials confirmed: 5 vials FDA approved at  on 1/7 by RF    No results found for this or any previous visit (from the past 96 hours).    Additional details: Embryos for possible GC. Likely will attempt to carry herself.  Georgina Golden  01/07/2025  7:51 AM    Angie Her 01/07/25 12:54 PM    Called patient to review plan as discussed in noon meeting with Dr. Finney. Patient to start Clomid, Gonal-F, Menopur, and HGH this evening and continue daily. Patient to return to office Friday 1/10 for follicle scan and E2 level, call transferred to  to schedule. Patient aware we will notify her of when to start Ganirelix. Medication instructions reviewed and sent in Front Desk HQ message along with video link. Encouraged patient to call " office with any questions or concerns.  Georgina Golden 01/07/25 2:24 PM

## 2025-01-10 ENCOUNTER — APPOINTMENT (OUTPATIENT)
Dept: ENDOCRINOLOGY | Facility: CLINIC | Age: 40
End: 2025-01-10

## 2025-01-10 ENCOUNTER — ANCILLARY PROCEDURE (OUTPATIENT)
Dept: ENDOCRINOLOGY | Facility: CLINIC | Age: 40
End: 2025-01-10

## 2025-01-10 DIAGNOSIS — N97.9 FEMALE INFERTILITY: ICD-10-CM

## 2025-01-10 LAB — ESTRADIOL SERPL-MCNC: 69 PG/ML

## 2025-01-10 PROCEDURE — 82670 ASSAY OF TOTAL ESTRADIOL: CPT

## 2025-01-10 PROCEDURE — 76857 US EXAM PELVIC LIMITED: CPT

## 2025-01-10 NOTE — PROGRESS NOTES
CYCLING NOTE- IVF #2 (1st at )    Treatment protocol: Antagonist /Menopur 150, Clomid first 5 days of stim (Clomid flare), +HGH   Trigger plan: HCG vs Lupron, consider co-trigger   Age: 39  AMH= 0.84  Patient of Dr. Root  Freezing embryos for possible future gestational carrier, will attempt to carry herself.    Back after 3 days of Clomid and FSH, HMG and HGH.    Here for US and/or lab monitoring; relevant findings reviewed.    Patient did not stay for discussion after monitoring,  Team will contact patient later today with results and plan.    Viromeds resulted, will sign off Summary of Records with Dr. Root today.    Shanae Araujo  01/10/2025  9:00 AM    MyChart to patient with plan, pt to continue same doses of medications and return on Monday for repeat follicle scan and estradiol.  Shanae Araujo 01/10/25 2:16 PM

## 2025-01-13 ENCOUNTER — ANCILLARY PROCEDURE (OUTPATIENT)
Dept: ENDOCRINOLOGY | Facility: CLINIC | Age: 40
End: 2025-01-13

## 2025-01-13 ENCOUNTER — LAB (OUTPATIENT)
Dept: LAB | Facility: LAB | Age: 40
End: 2025-01-13
Payer: COMMERCIAL

## 2025-01-13 DIAGNOSIS — N97.9 FEMALE INFERTILITY: ICD-10-CM

## 2025-01-13 LAB — ESTRADIOL SERPL-MCNC: 162 PG/ML

## 2025-01-13 PROCEDURE — 76857 US EXAM PELVIC LIMITED: CPT

## 2025-01-13 PROCEDURE — 82670 ASSAY OF TOTAL ESTRADIOL: CPT

## 2025-01-13 NOTE — PROGRESS NOTES
CYCLING NOTE- IVF #2 (1st at )     Treatment protocol: Antagonist /Menopur 150, Clomid first 5 days of stim (Clomid flare), +HGH   Trigger plan: HCG vs Lupron, consider co-trigger   Age: 39  AMH= 0.84  Patient of Dr. Root  Freezing embryos for possible future gestational carrier, will attempt to carry herself.     Back after 6 days of Clomid and FSH, HMG and HGH.     Here for US and/or lab monitoring; relevant findings reviewed.     Patient did not stay for discussion after monitoring,  Team will contact patient later today with results and plan.    Shanae Araujo 01/13/25 8:01 AM    myChart to patient with plan after reviewed with Dr. Finney. Pt to continue same doses and schedule to come back Wednesday for E2 and Follicle scan.  Shanae Araujo 01/13/25 1:35 PM

## 2025-01-14 ENCOUNTER — PHARMACY VISIT (OUTPATIENT)
Dept: PHARMACY | Facility: CLINIC | Age: 40
End: 2025-01-14
Payer: COMMERCIAL

## 2025-01-14 PROCEDURE — RXMED WILLOW AMBULATORY MEDICATION CHARGE

## 2025-01-15 ENCOUNTER — ANCILLARY PROCEDURE (OUTPATIENT)
Dept: ENDOCRINOLOGY | Facility: CLINIC | Age: 40
End: 2025-01-15

## 2025-01-15 ENCOUNTER — LAB (OUTPATIENT)
Dept: LAB | Facility: LAB | Age: 40
End: 2025-01-15
Payer: COMMERCIAL

## 2025-01-15 DIAGNOSIS — N97.9 FEMALE INFERTILITY: ICD-10-CM

## 2025-01-15 LAB — ESTRADIOL SERPL-MCNC: 351 PG/ML

## 2025-01-15 PROCEDURE — 76857 US EXAM PELVIC LIMITED: CPT

## 2025-01-15 PROCEDURE — 82670 ASSAY OF TOTAL ESTRADIOL: CPT

## 2025-01-15 NOTE — PROGRESS NOTES
CYCLING NOTE- IVF #2 (1st at )     Treatment protocol: Antagonist /Menopur 150, Clomid first 5 days of stim (Clomid flare), +HGH   Trigger plan: HCG vs Lupron, consider co-trigger   Age: 39  AMH= 0.84  Patient of Dr. Root  Freezing embryos for possible future gestational carrier, will attempt to carry herself.     Back after 8 days of Clomid and FSH, HMG and HGH.     Here for US and/or lab monitoring; relevant findings reviewed.     Patient stayed and spoke to MARI Pro after scan.  Team will contact patient later today with results and plan.     Shanae Araujo 01/15/25 9:40 AM    MyChart to patient with plan, will continue same doses, start ganirelix Friday AM and come back Friday. Pprobided pt reassurance that sometimes these cycles can be long and slow.  Shanae Araujo 01/15/25 1:42 PM

## 2025-01-16 ENCOUNTER — PHARMACY VISIT (OUTPATIENT)
Dept: PHARMACY | Facility: CLINIC | Age: 40
End: 2025-01-16
Payer: COMMERCIAL

## 2025-01-16 ENCOUNTER — SPECIALTY PHARMACY (OUTPATIENT)
Dept: PHARMACY | Facility: CLINIC | Age: 40
End: 2025-01-16

## 2025-01-16 DIAGNOSIS — N97.9 FEMALE INFERTILITY: ICD-10-CM

## 2025-01-16 PROCEDURE — RXMED WILLOW AMBULATORY MEDICATION CHARGE

## 2025-01-17 ENCOUNTER — LAB (OUTPATIENT)
Dept: LAB | Facility: LAB | Age: 40
End: 2025-01-17
Payer: COMMERCIAL

## 2025-01-17 ENCOUNTER — ANCILLARY PROCEDURE (OUTPATIENT)
Dept: ENDOCRINOLOGY | Facility: CLINIC | Age: 40
End: 2025-01-17

## 2025-01-17 DIAGNOSIS — N97.9 FEMALE INFERTILITY: ICD-10-CM

## 2025-01-17 LAB — ESTRADIOL SERPL-MCNC: 569 PG/ML

## 2025-01-17 PROCEDURE — 82670 ASSAY OF TOTAL ESTRADIOL: CPT

## 2025-01-17 PROCEDURE — 76857 US EXAM PELVIC LIMITED: CPT

## 2025-01-17 NOTE — PROGRESS NOTES
CYCLING NOTE  Cycle #: 2   Reason For Treatment:   Protocol: Antagonist /Menopur 150, Clomid first 5 days of stim (Clomid flare), +HGH   Day of stim: 11th day of medications   Patient Hx: 38 yo patient of Dr. Root, AMH-0.84    Here for US and/or lab monitoring; relevant findings reviewed.    Patient did not stay for discussion after monitoring,  Team will contact patient later today with results and plan.    Inocencia Walters  01/17/2025  8:30 AM    Called patient with plan to continue medications at current doses and return to office on Monday for follicle scan, E2 and P4.   Patient was driving at the time of the call and will call back to schedule.  Sent message to  detailing appointment patient needs to schedule.   [employer] Advisor message sent with plan.     Inocencia Walters 01/17/25 1:34 PM

## 2025-01-20 ENCOUNTER — TELEPHONE (OUTPATIENT)
Dept: ENDOCRINOLOGY | Facility: CLINIC | Age: 40
End: 2025-01-20

## 2025-01-20 ENCOUNTER — SPECIALTY PHARMACY (OUTPATIENT)
Dept: PHARMACY | Facility: CLINIC | Age: 40
End: 2025-01-20

## 2025-01-20 ENCOUNTER — LAB (OUTPATIENT)
Dept: LAB | Facility: LAB | Age: 40
End: 2025-01-20
Payer: COMMERCIAL

## 2025-01-20 ENCOUNTER — ANCILLARY PROCEDURE (OUTPATIENT)
Dept: ENDOCRINOLOGY | Facility: CLINIC | Age: 40
End: 2025-01-20

## 2025-01-20 DIAGNOSIS — N97.9 FEMALE INFERTILITY: ICD-10-CM

## 2025-01-20 LAB
ESTRADIOL SERPL-MCNC: 219 PG/ML
PROGEST SERPL-MCNC: 2.3 NG/ML

## 2025-01-20 PROCEDURE — 84144 ASSAY OF PROGESTERONE: CPT

## 2025-01-20 PROCEDURE — 76857 US EXAM PELVIC LIMITED: CPT | Performed by: OBSTETRICS & GYNECOLOGY

## 2025-01-20 PROCEDURE — 76857 US EXAM PELVIC LIMITED: CPT

## 2025-01-20 PROCEDURE — 82670 ASSAY OF TOTAL ESTRADIOL: CPT

## 2025-01-20 NOTE — PROGRESS NOTES
CYCLING NOTE- IVF #2 (1st at )     Treatment protocol: Antagonist /Menopur 150, Clomid first 5 days of stim (Clomid flare), +HGH   Trigger plan: HCG vs Lupron, consider co-trigger   Age: 39  AMH= 0.84  Patient of Dr. Root  Freezing embryos for possible future gestational carrier, will attempt to carry herself.     Back after 13 days of  FSH, HMG and HGH. Ganirelix started 1/17.     Here for US and/or lab monitoring; relevant findings reviewed.  Patient stayed for nurse visit. Patient tearful after scan not showing growth. She will go and get her lab work done and await plan discussed in meeting today. Patient only has 150 units of Gonal-f left, will need refill if stim continues. Patient mentioned resting HR has increased since starting injections about 20-30 bpm and wanted to ask if this is a normal symptom.     Georgina Golden 01/20/25 9:04 AM     ===    Upon review of labs and imaging patient likely ovulated follicle on right.   Will call patient this afternoon and have her follow up with dr Root.     Angie Her 01/20/25 1:20 PM    Dr. Mason called pt to discuss cancelled cycle and sent message to Dr. Root.  Shanae Araujo 01/20/25 5:26 PM

## 2025-01-20 NOTE — PROGRESS NOTES
[facility] PATIENT REASSESSMENT AND END OF THERAPY NOTE- FERTILITY    End of Therapy call not completed, IVF cycle cancelled 1/20/25.     IVF meeting monitoring by pharmacist below:    [facility] Supplied Medications:    Gonal-f 300 unit Redi-Ject pen, Gonal-f 900 unit Redi-Ject pen, Menopur 75 unit vials, Ganirelix 250mcg syringes, Omnitrope 5.8mg vials, Pregnyl 10,000IU vial for trigger , and Leuprolide 1mg/0.2mL kit for trigger    Treatment Start Date: 1/7/25  Treatment End Date: 1/20/25      IVF Meeting/Reassessment Tracking:     First Fill Assessment Completed: Yes, DW  New IVF Cycle- cycle 2 (first at [facility]), clomid flare/antagonist protocol, start medications 1/7/25     IVF Baseline Starting Doses:   Clomid 100mg PO daily 1/7-1/11  Gonal-F 250 units subcutaneous daily 1/7  Menopur 150 units subcutaneous daily 1/7  Omnitrope 25 units subcutaneous daily 1/7  Monitoring again 1/10/25     IVF Meeting 1/10/25:   Clomid 100mg PO daily 1/7-1/11  Continue Gonal-F 250 units subcutaneous daily   Continue Menopur 150 units subcutaneous daily  Continue Omnitrope 25 units subcutaneous daily  Monitoring again 1/13/25     IVF Meeting 1/13/25:   Continue Gonal-F 250 units subcutaneous daily   Continue Menopur 150 units subcutaneous daily  Continue Omnitrope 25 units subcutaneous daily  Monitoring again 1/15/25     IVF Meeting 1/15/25:   Continue Gonal-F 250 units subcutaneous daily   Continue Menopur 150 units subcutaneous daily  Continue Omnitrope 25 units subcutaneous daily  Start Ganirelix 250mcg subcutaneous daily in the morning 1/17  Monitoring again 1/17/25     IVF Meeting 1/17/25:   Continue Gonal-F 250 units subcutaneous daily   Continue Menopur 150 units subcutaneous daily  Continue Omnitrope 25 units subcutaneous daily  Continue Ganirelix 250mcg subcutaneous daily   Monitoring again 1/20/25     IVF Meeting 1/20/25  Patient ovulated, stop all medications. Cycle cancelled.     TOLERANCE:   Are  there any changes to current therapy regimen?No- no dose changes to medications during IVF stimulation cycle.    GOALS:  Your goals of therapy are:   Successful administration of IVF Medication(s)  Stimulate ovaries to produce multiple eggs for retrieval (Gonal-F, Follistim, Menopur, Low Dose HCG)  Suppress ovaries to prevent premature ovulation (Ganirelix/Cetrotide, Microdose & Long Lupron)  Trigger ovulation to prepare for egg retrieval (Lupron/Pregnyl)  Improve oocyte quality (Omnitrope)    All goals not achieved through medication therapy for this cycle.     COMPLIANCE / ADHERENCE:  Have you had any unplanned missed doses? No  If yes, how often do you miss doses and is there a particular contributing reason? Answer above based on IVF meeting monitoring.    Additional comments: Patient has  Specialty Pharmacy contact information. Patient was encouraged to reach out for additional questions or concerns.      Ania Pérez (Katie), PharmD  [facility]  Clinical Pharmacy Specialist- Fertility   [facility], [place] [address]  Email: [email or web address]  Tel: [phone]       Fax: [phone]

## 2025-01-20 NOTE — TELEPHONE ENCOUNTER
Reason for call:   Notes: Patient would like speak with nurse about her ovulation window. She believes she missed it.

## 2025-01-28 ENCOUNTER — TELEMEDICINE (OUTPATIENT)
Dept: ENDOCRINOLOGY | Facility: CLINIC | Age: 40
End: 2025-01-28
Payer: COMMERCIAL

## 2025-01-28 DIAGNOSIS — N97.9 FEMALE INFERTILITY: ICD-10-CM

## 2025-01-28 DIAGNOSIS — Z01.818 PRE-PROCEDURAL EXAMINATION: ICD-10-CM

## 2025-01-28 DIAGNOSIS — Z31.83 ENCOUNTER FOR ASSISTED REPRODUCTIVE FERTILITY CYCLE: ICD-10-CM

## 2025-01-29 ENCOUNTER — LAB (OUTPATIENT)
Dept: LAB | Facility: HOSPITAL | Age: 40
End: 2025-01-29
Payer: COMMERCIAL

## 2025-01-29 ENCOUNTER — APPOINTMENT (OUTPATIENT)
Dept: ENDOCRINOLOGY | Facility: CLINIC | Age: 40
End: 2025-01-29
Payer: COMMERCIAL

## 2025-01-29 DIAGNOSIS — N97.9 FEMALE INFERTILITY: ICD-10-CM

## 2025-01-29 LAB
PROGEST SERPL-MCNC: 9.1 NG/ML
PROGEST SERPL-MCNC: NORMAL NG/ML

## 2025-01-29 PROCEDURE — 84144 ASSAY OF PROGESTERONE: CPT

## 2025-01-29 RX ORDER — ESTRADIOL 2 MG/1
2 TABLET ORAL 2 TIMES DAILY
Qty: 30 TABLET | Refills: 0 | Status: SHIPPED | OUTPATIENT
Start: 2025-01-29 | End: 2026-01-29

## 2025-01-30 DIAGNOSIS — N97.9 FEMALE INFERTILITY: ICD-10-CM

## 2025-01-30 DIAGNOSIS — Z01.818 PRE-PROCEDURAL EXAMINATION: ICD-10-CM

## 2025-01-30 DIAGNOSIS — Z01.812 PRE-PROCEDURE LAB EXAM: ICD-10-CM

## 2025-01-30 NOTE — PROGRESS NOTES
Patient went to  lab yesterday for P4 level to confirm ovulation, patient already started 2mg estrace BID yesterday per Dr. Root while waiting for results as there was an issue with the lab. Patient had suspected ovulation during IVF cycle #1 and was cancelled, estimating ovulation around 1/19.   P4 level 9.1 today, planning to call with menses for baseline. Has FUV with Dr. Root Tuesday to finalize plan, was told to contact Dr. Root if she gets menses prior to FUV.   Shanae Araujo 01/30/25 10:15 AM    [facility] PROVIDER NOTE- PROGESTERONE CHECK  Ultrasound and/or labs reviewed at [facility].   Results for orders placed or performed in visit on 01/29/25 (from the past 96 hours)   Progesterone   Result Value Ref Range    Progesterone 9.1 ng/mL       Ovulatory progesterone  yes    Next steps: Continue estrace lead in as previously instructed, meet with Dr. Root to finalize plan next Tues; call office if menses prior to FUV  Jia Quiroz  01/30/2025  12:52 PM     Pt aware of above plan.  Shanae Araujo 01/30/25 12:57 PM

## 2025-02-04 ENCOUNTER — TELEMEDICINE (OUTPATIENT)
Dept: ENDOCRINOLOGY | Facility: CLINIC | Age: 40
End: 2025-02-04
Payer: COMMERCIAL

## 2025-02-04 DIAGNOSIS — N97.9 FEMALE INFERTILITY: ICD-10-CM

## 2025-02-04 DIAGNOSIS — N97.9 FEMALE INFERTILITY: Primary | ICD-10-CM

## 2025-02-04 DIAGNOSIS — Z31.69 PROCREATIVE MANAGEMENT COUNSELING: ICD-10-CM

## 2025-02-04 NOTE — PROGRESS NOTES
"  Virtual or Telephone Consent: An interactive audio and video telecommunication system which permits real time communications between the patient (at the originating site) and provider (at the distant site) was utilized to provide this telehealth service and Verbal consent was requested and obtained from Marcy Putnam on this date, 25 for a telehealth visit.    Follow Up Visit HPI    Patient is a 39 y.o.  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     Interval history  Had cancelled IVF cycle- Luteal estrace Clomid flare /menopur 150  Only 1 dominant follicle developed and patient ovulated despite ganirelix      Prior notes reviewed and updated:  Interval history:  Had HSG - broad-based uterus with no filling defects noted and bilateral tubal patency     -Did natural cycle with TIC- also luteal phase progesterone, no pregnancy.  Has only done 1 (could try last month due to HSG timing).         Prior note below reviewed:  Previously has seen audrey and Earnestine Linn to discuss gestational carrier due to CKD.  She saw MFM and now desires to consider pregnancy herself.     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 Last creatinine 0.97, P:C 100. \"    \"In summary, I see no overt contraindications to assisted reproduction. We discussed that she has an increased risk of fetal growth restriction, preeclampsia (including periviable preeclampsia), and permanent worsening of her renal function (0-10%) but " "pregnancy is NOT overtly contraindicated. Thank you for allowing me to participate in the care of your patient. Please do not hesitate to contact me with any further questions. \"    After this counseling, patient and partner decided to try to conceive naturally.  Attempted pregnancy naturally in August and had biochemical pregnancy--> positive home pregnancy test and delayed/heavy period.     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    Has been using condoms for contraception.       Testing to date:    Latest Reference Range & Units Most Recent   Hemoglobin A1C 4.3 - 5.6 % 5.3 (E)  24 07:50   Thyroid Stimulating Hormone 0.44 - 3.98 mIU/L 2.66  10/3/19 09:24   GLUCOSE 74 - 99 mg/dL 93  10/3/19 09:24   Estimated Average Glucose mg/dL 105 (E)  24 07:50   (E): External lab result  Other: [facility] labs  Creatinine 0.97 (2024)  AMH 0.84 (2024)  TSH 2024 2.410 -- Currently on 75 mcg synthroid     Had had STDs done through FDA lab for Viromeds  10/2023 and 3/2024    Status of fallopian tubes: Bilateral tubal patency      Saline Ultrasound: Not assessesd  TVUS and MRI suggestive of adenomyosis     Hysteroscopy:  Not assessed       Partner SA: Normal  Name: Rj Sanderson   :  1987  Occupation:   Prior paternity: No  Pertinent history:   SA in past year: Yes, normal at [facility]    Sperm Concentration  >=15.00 M/mL 45.50   Total Sperm Count  M 259.35   % Motile Sperm (%ND + %NP)  >=40 % 52   Forward Progression 2 = Poor to moderate, erratic   Total Motile Sperm  M 134.86   % Normal Forms, WHO (5th ed.)  >=4 % 4     -Had STD testing through [facility] 2023- scanned into his chart    Genetic screening Hx  Patient: Myriad 2bP: testing done at [facility] negaitve per " patient  Sperm Source: partner  Sperm: Myriad 2bP: Negative per patient  Need [facility] records to review  Need to review [facility] records  Yes pt and partner had carrier screening at [facility] also saw Genetic counselor at [facility] to assess if pt's kidney disease was genetic and all testing was negative per pt   -Update:  Carrier screening scanned into patients' chart,negaive (invitae)     Treatment to date:   Fertility Cycles       Cycle Name Treatment Start Date Type Outcome    IVF Cycle #2 (x1 at [facility]) - CANCELLED 01/06/2025 ICSI, Retrieval, Cryopreservation (Embryo), Biopsy for PGT Active    IVF Cycle #3 (x1 [facility]) #2 cancelled  ICSI, Retrieval, Cryopreservation (Embryo), Biopsy for PGT Planning    Letrozole Monitor/TIC #2 (only 1 IUI) 12/06/2024 Timed [place] No Pregnancy    Letrozole IUI #1 11/04/2024 IUI No Pregnancy    Natural, LP Prometrium/ TIC 09/12/2024 Timed [place] No Pregnancy          Past Infertility Treatments:  See below  1 Cycle of IVF at [facility]- 3/2024  Natural start  Antagonist, , 5 units low dose hcg   5 oocytes  3 mature  2 fertilized  2 Blasts,   PGT-A   1 monosomy 16  1 monosomy 18 XYY    PMH:  Chronic Kidney Disease   ADHD   IBS  Lisinopril 5 mg PO daily--> Stopped end of July   Synthroid 75 mcg PO daily   Took Growth hormone shots as a child Age 5 until 15 years old   Past Medical History:   Diagnosis Date    Anxiety disorder, unspecified     Anxiety    Infertility, female     Personal history of other diseases of the female genital tract     History of abnormal cervical Papanicolaou smear    Personal history of other specified conditions     History of headache    Raynaud's syndrome without gangrene     Raynauds disease    Renal dysplasia     Cystic dysplasia of one kidney     Past Surgical History:   Procedure Laterality Date    CERVICAL BIOPSY  W/ LOOP ELECTRODE EXCISION  11/09/2016    Cervical Loop Electrosurgical Excision (LEEP)    COLPOSCOPY  11/09/2016    Colposcopy    KIDNEY SURGERY   11/07/2016    Kidney Surgery    OTHER SURGICAL HISTORY  11/07/2016    Nephrectomy    OTHER SURGICAL HISTORY  11/09/2016    Pyeloplasty     Current Outpatient Medications on File Prior to Visit   Medication Sig Dispense Refill    cholecalciferol (Vitamin D-3) 50 MCG (2000 UT) tablet Take 1 tablet (50 mcg) by mouth once daily.      chorionic gonadotropin (Pregnyl) 10,000 unit injection Reconstitute according to instructions and inject 10,000 units (1 mL) under the skin as a one time dose, as directed per provider for trigger. 1 each 0    chorionic gonadotropin (Pregnyl) 10,000 unit injection Reconstitute according to instructions and inject 10,000 units (1 mL) under the skin as a one time dose, as directed per provider for trigger. 1 each 0    co-enzyme Q-10 30 mg capsule Take 1 capsule (30 mg) by mouth once daily.      estradiol (Estrace) 2 mg tablet Take 1 tablet (2 mg) by mouth 2 times a day. 30 tablet 0    follitropin kiki (Gonal-f) 900/1.5 unit/mL pen injector pen injection Inject 250 Units under the skin once daily in the evening. Inject under the skin as directed by provider. 2 each 2    ganirelix (Orgalutran) 250 mcg/0.5 mL syringe injection Inject 250 mcg (1 syringe) under the skin once daily as directed per provider. 3 each 2    letrozole (Femara) 2.5 mg tablet Take 2 tablets (5 mg total) by mouth once daily.  Take 2 tablets by mouth cycle days 3-7 after negative urine pregnancy test every cycle before starting letrozole. 10 tablet 0    levothyroxine (Synthroid, Levoxyl) 75 mcg tablet Take 1 tablet (75 mcg) by mouth early in the morning.. Take on an empty stomach first thing in the morning, nothing to eat or drink besides water for 1 hour, as well as other medications. Take prenatals in the evening. 30 tablet 2    menotropins (Menopur) 75 unit recon soln injection Reconstitute and inject 150 Units under the skin once daily in the evening. Inject under the skin as directed by provider. 20 each 2    PNV  no.153/FA/om3/dha/epa/fish (PRENATAL GUMMIES ORAL) Take 4 tablets by mouth once daily.      progesterone (Prometrium) 100 mg capsule Insert 1 capsule (100 mg) into the vagina 2 times a day. Start testing with OPKs on CD 10. You will start Prometrium 4 days after a positive OPK (Patient not taking: Reported on 2024) 60 capsule 0    progesterone (Prometrium) 100 mg capsule Insert 1 capsule (100 mg) into the vagina 2 times a day. Starting 3 days after IUI 60 capsule 0    somatropin (Omnitrope) Draw up 1.14 mL of Bacteriostatic Water and inject into Omnitrope vial. Using an insulin syringe, draw up 25 units and inject under the skin once daily as directed per provider. 2 each 2    valACYclovir (Valtrex) 500 mg tablet Take 1 tablet (500 mg) by mouth once daily. (Patient not taking: Reported on 2024)      [DISCONTINUED] estradiol (Estrace) 2 mg tablet Take 1 tablet (2 mg) by mouth 2 times a day. Starting 3 days after IUI or 4 days after OPK or 5 days after trigger 60 tablet 0     No current facility-administered medications on file prior to visit.       BMI:   BMI Readings from Last 1 Encounters:   24 30.67 kg/m²     VITALS:  There were no vitals taken for this visit.  LMP: No LMP recorded.    ASSESSMENT   39 y.o.  female with  years, Diminished Ovarian Reserve and Chronic Kidney Disease  and the following pertinent medical issues: CKD, s/p MFM consult.    COUNSELING  We reviewed the disappointing cycle outcome.  Reviewed options including Clomid mini stim vs Stop lupron protocol  Will start with mini stim and if not great response we will consider Stop Lupron in the future  Also reviewed egg donor as alternative    Routine Testing  Fertility Center  STDs Within 1 year   Genetic carrier Waiver/Completed   T&S Within 1 year   AMH Within 1 year   TSH Within 1 year   Rubella/Varicella Within 5 years     BMI Testing  Fertility Center  CBC Within 1 year   CMP Within 1 year   HgbA1c Within 1 year    Mag, Phos, Vit D <18 Within 1 year   MFM > 40  REQ   Wt loss consult > 40 OPT     PLAN  No orders of the defined types were placed in this encounter.      FOLLOW UP   Consults:  None, already had MFM consult, stopped lisonopril  Engaged MD  Take prenatal vitamins, vitamin D 2000 IUs daily  Discussed that treatment cannot proceed until checklist items are complete.   Additional testing for BMI < 18 or > 40: NA.  Chart to primary nurse for care coordination and patient check list/education.    MD Completion:  Ectopic Risk: No  Medically Complex: Yes  Outstanding boarding pass items:   FDA labs if indicated    Fertility Plan Update:  Patient will baseline tomorrow for Clomid mini-stim protocol.  Luteal estrace Clomid mini stim  Clomid 100 mg CD3-7 with  overlap last 2 days  Add menopur with antagonist or as needed  +HGH throughout stim  NOTE: Patient ovulated prematurely last cycle.  Recommend monitoring LH levels at EACH visit.  Start antagonist when E2>300 or follicle >12 mm. Close monitoring at end of stim (daily or every other day- do not let her monitor longer than every other day).  -Plan ICSI with partner FDA tested sperm- Freeze all with PGT-A for possible future gestational carrier      Poornima Root 02/04/25 3:20 PM

## 2025-02-05 ENCOUNTER — ANCILLARY PROCEDURE (OUTPATIENT)
Dept: ENDOCRINOLOGY | Facility: CLINIC | Age: 40
End: 2025-02-05

## 2025-02-05 ENCOUNTER — SPECIALTY PHARMACY (OUTPATIENT)
Dept: PHARMACY | Facility: CLINIC | Age: 40
End: 2025-02-05

## 2025-02-05 ENCOUNTER — PHARMACY VISIT (OUTPATIENT)
Dept: PHARMACY | Facility: CLINIC | Age: 40
End: 2025-02-05
Payer: COMMERCIAL

## 2025-02-05 ENCOUNTER — LAB REQUISITION (OUTPATIENT)
Dept: LAB | Facility: HOSPITAL | Age: 40
End: 2025-02-05
Payer: COMMERCIAL

## 2025-02-05 DIAGNOSIS — Z01.818 PRE-PROCEDURAL EXAMINATION: ICD-10-CM

## 2025-02-05 DIAGNOSIS — N97.9 FEMALE INFERTILITY, UNSPECIFIED: ICD-10-CM

## 2025-02-05 DIAGNOSIS — N97.9 FEMALE INFERTILITY: ICD-10-CM

## 2025-02-05 DIAGNOSIS — Z01.818 ENCOUNTER FOR OTHER PREPROCEDURAL EXAMINATION: ICD-10-CM

## 2025-02-05 LAB
ERYTHROCYTE [DISTWIDTH] IN BLOOD BY AUTOMATED COUNT: 12.3 % (ref 11.5–14.5)
ESTRADIOL SERPL-MCNC: 434 PG/ML
HCT VFR BLD AUTO: 40.2 % (ref 36–46)
HGB BLD-MCNC: 13.8 G/DL (ref 12–16)
MCH RBC QN AUTO: 29.7 PG (ref 26–34)
MCHC RBC AUTO-ENTMCNC: 34.3 G/DL (ref 32–36)
MCV RBC AUTO: 87 FL (ref 80–100)
NRBC BLD-RTO: 0 /100 WBCS (ref 0–0)
PLATELET # BLD AUTO: 319 X10*3/UL (ref 150–450)
RBC # BLD AUTO: 4.65 X10*6/UL (ref 4–5.2)
WBC # BLD AUTO: 9.3 X10*3/UL (ref 4.4–11.3)

## 2025-02-05 PROCEDURE — 82670 ASSAY OF TOTAL ESTRADIOL: CPT

## 2025-02-05 PROCEDURE — RXMED WILLOW AMBULATORY MEDICATION CHARGE

## 2025-02-05 PROCEDURE — 85027 COMPLETE CBC AUTOMATED: CPT | Mod: OUT | Performed by: NURSE PRACTITIONER

## 2025-02-05 PROCEDURE — 83002 ASSAY OF GONADOTROPIN (LH): CPT

## 2025-02-05 PROCEDURE — 76857 US EXAM PELVIC LIMITED: CPT

## 2025-02-05 PROCEDURE — 82670 ASSAY OF TOTAL ESTRADIOL: CPT | Mod: OUT | Performed by: NURSE PRACTITIONER

## 2025-02-05 PROCEDURE — 85027 COMPLETE CBC AUTOMATED: CPT

## 2025-02-05 PROCEDURE — 83002 ASSAY OF GONADOTROPIN (LH): CPT | Mod: OUT | Performed by: NURSE PRACTITIONER

## 2025-02-05 RX ORDER — CLOMIPHENE CITRATE 50 MG/1
100 TABLET ORAL DAILY
Qty: 10 TABLET | Refills: 0 | Status: SHIPPED | OUTPATIENT
Start: 2025-02-05 | End: 2025-02-10

## 2025-02-05 RX ORDER — GANIRELIX ACETATE 250 UG/.5ML
250 INJECTION, SOLUTION SUBCUTANEOUS EVERY MORNING
Qty: 5 EACH | Refills: 2 | Status: SHIPPED | OUTPATIENT
Start: 2025-02-05

## 2025-02-05 NOTE — PROGRESS NOTES
DEANN NOTE - IVF STIM BASELINE  Patient presents for baseline ultrasound and/or labs.    Treatment protocol: Clomid mini-stim protocol. Clomid 100 mg CD3-7 with  overlap last 2 days, Add menopur with antagonist or as needed, +HGH throughout stim  NOTE: Patient ovulated prematurely last cycle.  Recommend monitoring LH levels at EACH visit.  Start antagonist when E2>300 or follicle >12 mm. Close monitoring at end of stim (daily or every other day- do not let her monitor longer than every other day).  -Plan ICSI with partner FDA tested sperm- Freeze all with PGT-A for possible future gestational carrier  Lead in: Estrace  Start date for lead in: 1/29/25  Last estrace/OCP date: 2/5 AM  PGT-A/M? Yes; Req Sent: Yes; PGT-M Test Ready: No n/a; Company: [employer]  PGT order scanned into Epic, confirmed by: STARLA on 2/5/25  Plan to freeze: embryos    [employer] forms/out waiver complete:  Yes    Does patient have medications onhand?  No will order today, was unsure of protocol until 2/4  Pharmacy: [facility]    Boarding pass signed off:  No need addendum today with new protocol otherwise up to date    CBC reviewed by MD?  Yes    Date of Female STDs: 2/22/24- viromeds to be done today    Date of Male STDs: 12/31/24    Medically complex on boarding pass:   yes    If indicated, is PAT consult complete?  N/A    SPERM:  partner  Frozen FDA eligible sample  Number of vials confirmed: 5 vials on 2/5 by RW    No results found for this or any previous visit (from the past 96 hours).    Additional details: freezing for future GC, viromeds to be done today  Shanae Araujo  02/05/2025  8:54 AM    Angie Her 02/05/25 12:51 PM     MyChart to patient with plan, will stop estrace and begin clomid 100mg tomorrow for 5 days and start  on Sunday  as well as HGH and come back next Wednesday for E2 LH and follicle scan.    Shanae Araujo 02/05/25 1:12 PM

## 2025-02-05 NOTE — PROGRESS NOTES
Attachment E  FDA: Donor Eligibility Summary of Records    Donor Name: Marcy Putnam                      : 1985  Donor ID Number [id number]     Statement: Blood is processed by [facility], an extension of [facility]. Certified FDA lab.     Date of Sperm Collection/Freeze or Date of Oocyte Retrieval: 25    [x]   Labs: Female within 30 days of donation. Male within 7 days at FDA approved lab.   1st Set     Date done: 25 Date confirmed normal: 25   2nd Set (If applicable)  Date done: N/A Date confirmed normal: N/A    Has patient donated since his/her last history and physical?   [x]   Donor Medical History Interview Form (must be completed within 6 months or abbreviated history if less than 6 months but recent donation)  Date completed: 24  If this was abbreviated, date full screen was done:N/A     [] N/A   Donor Medical History Form (only applicable if anonymous donor)  Date our history form was completed: N/A    [x]    Donor physical form within 6 months of donation.   Does not need to be repeated even if patient has donated since last physical, as long as last physical was within 6 months of projected date of current donation.   Date Completed: 24 Normal    [x]     Final Eligibility: Eligible       Eligibility Determined by:   Provider Signature: Poornima Root 02/10/25 11:21 AM  RN Signature:   Shanae Araujo 02/10/25 10:06 AM            Attachment D:  Clinician Sign-off Sheet  Blood is processed by [facility], an extension of [facility]. Certified FDA lab       FEMALE PATIENT LABS: Test Date  Result   HBV Surface Antigen 25 Negative   HBV Core Total Ab 25 Negative   HCV Ab 25 Negative   Syphilis T pallidum IgG 25 Negative   HIV 1/O/2 Ab 25 Negative   HIV 1 IAN 25 Negative   HCV IAN 25 Negative   HBV IAN 25 Negative   Chlamydia MICHAEL 25 Negative   Gonorrhea MICHAEL 25 Negative   West Nile Virus IAN 25  Negative   ABO GROUPING AND RHO TYPING 24 A+   FDA Physical 24 Normal         Eligibility Determined by:   Provider Signature: Poornima Root 02/10/25 11:21 AM  RN Signature:  Shanae Araujo 02/10/25 10:07 AM          Attachment A:  DONOR MEDICAL HISTORY INTERVIEW QUESTIONNAIRE  [facility] Donor Screening for Risk Factors or Conditions  For Donor Eligibility Evaluator     Donor name: Marcy Putnam               : 1985   Donor ID Number:   [id number]        Question Answer per Question Comments   1.  Female:  In the past 12 months, have you had sex with a  man who has had sex with another man in the past 5 years?       Male:  Have you had sex with another male in the past 5 years, even once? No If yes:  Female, ineligible.  Donor may be reconsidered for eligibility after 12 months of last referenced sexual contact.    Male, ineligible.  Donor may be reconsidered for eligibility after 5 years from date of last referenced sexual contact.   2.  Have you injected drugs for a non-medical reason in the last 5 years, including intravenous, intramuscular and subcutaneous injection? No If yes, ineligible.   3.  Have you received human-derived clotting factor  concentrates for hemophilia or a related clotting disorder?    No If yes, ineligible.  A donor who received clotting factors once to treat an acute bleeding event more than 12 months ago may be eligible to donate.     4.  In the past 5 years, have you been given money or drugs in exchange for having sex? No If yes, ineligible.   5.  In the past 12 months, have you had sex with anyone who would answer yes to any of questions 1, 2, 3 or 4? No If yes, ineligible.  Donor may be reconsidered for eligibility after 12 months of last referenced sexual contact.     6.  In the past 12 months, have you had sex with a person known or suspected to have HIV infection, active  hepatitis B infection or hepatitis C infection, clinically active hepatitis B infection, or  hepatitis C infection? No If yes, ineligible.  Donor may be reconsidered for eligibility after 12 months of last referenced sexual contact.   7.  In the past 12 months, have you been exposed to known or suspected HIV, hepatitis B and/or hepatitis C infected blood through percutaneous inoculation (e.g., needlestick) or through contact with an open wound, non-intact skin or   mucous membrane? No If yes, ineligible.  Donor may be reconsidered for eligibility after 12 months from date of exposure.   8.  In the past 12 months have you had an accidental needle stick, sharp instrument injury, contact with human blood, serum or plasma in the eye, mucus membranes (lips, interior of nose) or sores. No If yes, ineligible.  Donor may be reconsidered for eligibility after 12 months from date of exposure.   9.   In the past 12 months, have you been in correction for more than 72 consecutive hours? No If yes, ineligible.  Donor may be reconsidered for eligibility after 12 months from date of last day of incarceration.   10.  In the past 12 months, have you lived with (resided in the same dwelling) another person who has Hepatitis B or  clinically active (symptomatic) Hepatitis C infection?  No If yes, ineligible.  Donor may be reconsidered for eligibility after 12 months from date of last contact.   11. In the past 12 months, have you had ear or body piercing or tattooing? No If no, go to question 12.  If yes, go to question 11a       11a. Did you have a tattoo in the past 12 months? If yes, when? NA If no, go to question 11c       11b. Were sterile instruments used? NA If yes, acceptable. If no donor is ineligible; may be reconsidered 12 months from date of tattoo.         11c. Did you have ear, skin or body piercing performed in the past 12 months? NA If no, go to question 12.        11d. Were sterile instruments used? NA If yes, acceptable. If no donor is ineligible:donor may be reconsidered for eligibility after 12 months from date  of procedure.   12. After age 11 have you had a clinical diagnosis of      symptomatic viral hepatitis? No If yes, go to question 12a.       12a. Was the hepatitis identified as Hepatitis A (e.g. reactive IgM anti-HAV test), Katie-Barr, or Cytomegalovirus?   NA If no, ineligible.  If yes, go to question 13.  Medical record documentation and review required   13.  Have you, your sexual partner(s) or any member of your household ever had a transplant or medical procedure that involved being exposed to live cells, tissues or organs from an animal? No If no, go to question 14.         13a.  If the person referred to in question 13 was a  member of your household, were you exposed to that individual’s blood, saliva or other body fluids (e.g., through deep kissing, shared  toothbrushes, razors, or        needles, or through open wounds or sores). NA If yes, ineligible.    14.  Have you been diagnosed with West Nile Virus (including diagnosis based on symptoms and/or laboratory results, or   confirmed WNV viremia) in the past 120 days? No If no, proceed to question 15.  If yes, ineligible.  Donor may be reconsidered for eligibility after 120 days from diagnosis.   14a. Have you been suspected of having West Nile Virus in the past 120 days?       NA If yes, ineligible.  Donor may be reconsidered for eligibility after 120 days from diagnosis.   15. Within the past 8 weeks, have you had a smallpox   vaccination or had close contact with the vaccination site of anyone else?  (Examples of close contact include touching the site, the bandages covering the site or   handling bedding or clothing that has been in contact with   an unbandaged vaccination site.) No If no, go to question 16.       15a. Did you have a smallpox vaccination? NA If no, go to question 16.         15b. Did scab separate/fall off by itself? NA If yes, donor is eligible if it has been at least 21 days post-vaccination, or may be reconsidered for eligibility  after 21 days post-vaccination.  If no (scab still present) donor is ineligible; donor may be reconsidered for eligibility after scab separates spontaneously.  If no (scab was removed before  spontaneously) donor is ineligible; donor may be reconsidered for eligibility 2 months after vaccination.   16. If you had close contact with a smallpox vaccination     recipient, have you had any new skin rash or sore since     the time of contact?  No If no, go to question 17.       16a. Did scab fall off/separate spontaneously? NA If yes, acceptable.  If no (scab is still present) donor is ineligible; donor may be reconsidered for eligibility after scab separates spontaneously.  If no (scab was removed before  spontaneously) donor is ineligible; donor may be reconsidered for eligibility 2 months after vaccination.       16b. Did you have any illness or complications from your  vaccination or from your close contact with someone who was vaccinated? NA If yes, ineligible.  Donor may be reconsidered for eligibility 14 days after complete resolution of all vaccine related complications.   17.  In the past 12 months have you had or been treated for syphilis, chlamydia or gonorrhea? No If yes, ineligible.  Donor may be reconsidered for eligibility after 12 months from date of treatment.   18. Have you or any of your blood relatives been diagnosed with Creutzfeld-Medardo disease (CJD)?   No If yes, ineligible.  If blood relative diagnosed with CJD, candidate may be accepted if he/she had genetic testing documenting he/she does not have mutation associated with familial CJD, the CJD diagnosis in the relative was subsequently found to be incorrect, or the cause of CJD in the relative was iatrogenic.   19. Have you been diagnosed with dementia or any degenerative or demyelinating disease of the central nervous system or other neurological disease of unknown etiology? No If yes, ineligible.   20. Have you ever  received growth hormone made from   human pituitary glands? No If yes, ineligible.   21.  Have you ever received a non-synthetic dura mater (brain covering) graft or xenotransplantation product?   No If yes, ineligible.   22. From 1980 through 1996 were you a member of the [place] , a civilian  employee or a dependent of a  member or civilian  employee? Yes If no, go to question 23.       22a. Did you spend a total of 6 months or more associated with a  base in any of the following countries, [place], [place], or [place] between 1980 and 1990; or [place], [place], [place], [place], or [place] between 1980 and 1996? No If yes, ineligible.    23.  Since 1980, have you ever lived in or traveled to [place]? (Includes [place], [place], [place], [place], [place], [place], [place], [place], [place],   [place], [place], [place], [place], [place], [place], [place], [place], [place], [place], [place], [place], [place], [place], [place], [place], [place], [place], [place], and [facility]). Yes If yes, go to question 23a.  If no, go to question 24.       23a.  From the beginning of 1980 through the end of   1996 did you spend time that adds up to 3 months or    more in the [place]  (Includes [place], [place], [place], [place], the [place], the [place], [place] and the [place]).      No If yes, ineligible.       23b. Since 1980 have you received a transfusion of blood or blood components in the [place], or [place]? No If yes, ineligible.       23c. Since 1980 have you spent time that adds up to 5   years or more in [place] (including time spent in the  [place] between 1980 and 1996)? No If yes, ineligible.   ADDITIONAL UNIQUE CIRCUMSTANCE QUESTIONS:       24.  Have you ever been to or had sexual contact with     anyone who was born in or lived in certain countries in   [place] ([place], [place],  "[place],   [place], [place], [place], [place], or [place])   after 1977? No If yes, ineligible unless negative HIV-O antibody test negative.  This question need not be asked if the testing reference lab utilizes an assay that includes Ab testing for HIV-O subtype.    25.  Have you received a blood transfusion or any medical treatment that involved blood in the countries listed in Question 24? No If yes, ineligible unless negative HIV-O antibody test negative.  This question need not be asked if the testing reference lab utilizes an assay that includes Ab testing for HIV-O subtype   26. Only relevant when there is a current SARS outbreak in the world:  Any travel to or reside in SARS affected areas in the last 14 days; any close contact with someone who has traveled to or resided in an affected area in the last 14 days; being treated for SARS, or suspected SARS in the last 28 days; close contact within the last 14 days with persons with SARS or suspected SARS? No If yes, ineligible.      Interview completed by: Shanae Araujo 12/26/24 3:03 PM         FDA PHYSICAL --> see original note in chart from 12/4/24 from Maria Elena Monroe NP, copy is pasted below:     Vitals: Temp: 36.4  /80  P: 65  R: 16     Physical Exam  General: well  Heart: regular rhythm without murmur  Lungs: clear  Abdomen: normal    Pelvic:  EGBUS Normal   Vagina: Normal   Cervix: Normal   Uterus: Midline, Normal size   Adnexa: Negative     Specfic assessments for oocyte or sperm donors:  Is there any evidence of the following:  Comment if any are \"Yes\"   Recent tattoo: No  **if recent tattoo notate if under sterile technique, if within the past year we will need copy of parlor license  Recent body piercing: No   **if recent piercing notate if under sterile technique, if within the past year we will need copy of parlor license  Poor basic hygiene:  No  Genital lesions: No   Insertion trauma: No   Genital / perianal warts: No   Other physical evidence " of sexually transmitted disease: No  Non medical injection sites: No   Home produced tattoo: No  Enlarged lymph nodes: No   Oral thrush: No   Blue purple spots / lesions: No  Trauma / infection: No   Sepsis / rash: No  Jaundice / icterus: No  Enlarged liver: No  Scabs / small pox: No  Eczema vaccinatum:   No  Vaccinia necrosum: No   Vaccinia keratitis: No  Rashes: No  Speculum exam completed to assess for cervicitis, genital ulcerative disease, herpes simplex, genital warts, or chancroid. Evidence of disease : No   Maria Elena Monroe 12/04/24 10:02 AM

## 2025-02-06 DIAGNOSIS — N97.9 FEMALE INFERTILITY: ICD-10-CM

## 2025-02-07 ENCOUNTER — SPECIALTY PHARMACY (OUTPATIENT)
Dept: PHARMACY | Facility: CLINIC | Age: 40
End: 2025-02-07

## 2025-02-07 DIAGNOSIS — N97.9 FEMALE INFERTILITY: ICD-10-CM

## 2025-02-07 LAB — LH SERPL-ACNC: 4.6 IU/L

## 2025-02-07 NOTE — PROGRESS NOTES
"[facility] Clinical Note  Initial Patient Education- Fertility    Introduction  Marcy Putnam is a 39 y.o. female who is on the specialty pharmacy service for management of: Fertility Core.    Marcy Putnam is initiating the following therapy. Dose and directions will be documented below for each medication.   Gonal-f 300 unit Redi-Ject pen, Menopur 75 unit vials, Ganirelix 250mcg syringes, Omnitrope 5.8mg vials, Pregnyl 10,000IU vial for trigger , and Leuprolide 1mg/0.2mL kit for trigger    Medication receipt date: 2/7/25   Duration of therapy: Patient/Provider Specific- depends on response to medication therapy.     The most recent IVF baseline note with the referring prescriber Fellow  on 2/5/25 was reviewed.  Pharmacy will continue to collaborate in the care of this patient with the referring prescriber.    Medications should only be prescribed by fertility specialists for this indication.    Clinical Background  An initial assessment was conducted prior to first fill of the medication to determine the appropriateness of therapy given the patient's diagnosis, medication list, comorbidities, allergies, medical history, patient's ability to self administer medication, and therapeutic goals based on possible outcomes of therapy. Refer to initial assessment task completed on 12/11/25.    Labs for clinical appropriateness that were reviewed include:   Fertility - Estradiol level:   Lab Results   Component Value Date    ESTRADIOL 434 02/05/2025   , AMH: No results found for: \"AMH\", and BMI: 30.67    Education/Discussion  Marcy was contacted on 2/7/2025 at 10:20 AM for a pharmacy visit with encounter number [phone] from:   [facility]  [address] [place] [id number]  Dept: [phone]  Dept Fax: [phone]  Marcy contacted via telephone for initial patient education. Patient declined pharmacist outreach. Patient has no further " questions, has used all the medications before in previous 2 cycles. Does not wish to re-review medications on the phone, but did request that I send written instructions through [employer]. Sent 2/7/25.     Medication Start Date (planned or actual): 2/9/25 starting Gonal F and Omnitrope  Education was conducted prior to start of therapy? Yes, attempted to provide education prior to start. Patient declined.     Education discussed includes the following:  Patient Education  Counseled the Patient on the Following : Pharmacy contact information, Doses and administration, Expected duration of therapy, Safe handling, storage, and disposal (patient discussed on phone duration/starting Menopur and Ganirelix together when instructed)  Learner: Patient  Education Method: Explanation, Handout (patient declined [facility] outreach, cycle 3 for her, all same medications just different protocol. requested i re-send written instructions through [employer].)  Education Response: Verbalizes understanding  Additional details of the medication specific counseling are found within the linked patient education flowsheet.     Date of outreach: 2/7/25  Patient denied the pharmacist's offer of counseling.  Follow up timeline, adherence, goals of therapy, and side effects NOT discussed with patient- declined.   Contact the Fertility Clinical Pharmacy Specialist or Support Liaison with any clinical or billing inquiries, as well as refill requests.      Treatment Start Date: 2/9/25  Next refill date: Tentative based on monitoring  Reassessment date: IVF meetings    Impression/Plan  Review and Assessment   Reviewed During This Encounter: Allergies, Medications, Problem list  Medications Assessed for Appropriate Use, Dose, Route, Frequency, and Duration: Yes  Medication Reconciliation Completed: Yes  Drug Interactions Evaluated: Yes  Clinically Relevant Drug Interactions Identified: No    This patient has not been identified as high risk.   The following  action was taken: N/A.    QOL/Patient Satisfaction  Rate your quality of life on scale of 1-10:  (declined)  Rate your satisfaction with  Specialty Pharmacy on scale of 1-10:  (decline)    Provided contact information ([phone]) for [facility] and reviewed dispensing process, refill timeline, and patient management follow up. Advised to contact the pharmacy if there are any adverse effects and/or changes to medication list, including prescriptions, OTC medications, herbal products, or supplements. Confirmed understanding of education conducted during assessment. All questions and concerns were addressed and patient was encouraged to reach out for additional questions or concerns.    Sent to patient:   Below is the link for the [facility] Welcome Packet for your reference:    [email or web address]        Ania Pérez (Katie), PharmAMBER  [facility]  Clinical Pharmacy Specialist- [facility], [facility] Graham  [address] [place] [address]  Email: [email or web address]  Tel: [phone]       Fax: [phone]

## 2025-02-08 DIAGNOSIS — N97.9 FEMALE INFERTILITY: ICD-10-CM

## 2025-02-09 DIAGNOSIS — N97.9 FEMALE INFERTILITY: ICD-10-CM

## 2025-02-10 DIAGNOSIS — N97.9 FEMALE INFERTILITY: ICD-10-CM

## 2025-02-11 DIAGNOSIS — N97.9 FEMALE INFERTILITY: ICD-10-CM

## 2025-02-12 ENCOUNTER — ANCILLARY PROCEDURE (OUTPATIENT)
Dept: ENDOCRINOLOGY | Facility: CLINIC | Age: 40
End: 2025-02-12

## 2025-02-12 ENCOUNTER — PHARMACY VISIT (OUTPATIENT)
Dept: PHARMACY | Facility: CLINIC | Age: 40
End: 2025-02-12
Payer: COMMERCIAL

## 2025-02-12 ENCOUNTER — LAB REQUISITION (OUTPATIENT)
Dept: LAB | Facility: HOSPITAL | Age: 40
End: 2025-02-12
Payer: COMMERCIAL

## 2025-02-12 ENCOUNTER — APPOINTMENT (OUTPATIENT)
Dept: ENDOCRINOLOGY | Facility: CLINIC | Age: 40
End: 2025-02-12

## 2025-02-12 DIAGNOSIS — N97.9 FEMALE INFERTILITY, UNSPECIFIED: ICD-10-CM

## 2025-02-12 DIAGNOSIS — N97.9 FEMALE INFERTILITY: ICD-10-CM

## 2025-02-12 LAB
ESTRADIOL SERPL-MCNC: 511 PG/ML
LH SERPL-ACNC: 4.8 IU/L

## 2025-02-12 PROCEDURE — RXMED WILLOW AMBULATORY MEDICATION CHARGE

## 2025-02-12 PROCEDURE — 83002 ASSAY OF GONADOTROPIN (LH): CPT

## 2025-02-12 PROCEDURE — 76857 US EXAM PELVIC LIMITED: CPT | Performed by: OBSTETRICS & GYNECOLOGY

## 2025-02-12 PROCEDURE — 82670 ASSAY OF TOTAL ESTRADIOL: CPT

## 2025-02-12 PROCEDURE — 76857 US EXAM PELVIC LIMITED: CPT

## 2025-02-12 NOTE — PROGRESS NOTES
CYCLING NOTE- IVF cycle #3 (#2 cancelled due to premature ovulation, 1st at [facility])    Here for US and/or lab monitoring; relevant findings reviewed.    Pt is 39 years old, AMH of 0.8, pt of Dr. Root.  Treatment protocol: Clomid mini-stim protocol. Clomid 100 mg CD3-7 with  overlap last 2 days, Add menopur with antagonist or as needed, +HGH throughout stim  NOTE: Patient ovulated prematurely last cycle.  Recommend monitoring LH levels at EACH visit.  Start antagonist when E2>300 or follicle >12 mm. Close monitoring at end of stim (daily or every other day- do not let her monitor longer than every other day).  -Plan ICSI with partner FDA tested sperm- Freeze all with PGT-A for possible future gestational carrier    Patient is back after 5 days of Clomid and 3 days of FSH and HGH. Plan was for possible ganirelix today.    Patient stayed for nurse visit. Pain is 0/10, spoke to MARI Manzano.  Team will contact patient later today with results and plan.    Shanae Araujo  02/12/2025  9:21 AM      MyChart to pt with plan, will continue 150 FSH, add 75 of HMG and started ganirelix this morning which will continue each morning, will also continue HGH. Pt to come back Friday for E2, P4 and LH and follicle scan.  Shanae Araujo 02/12/25 2:06 PM     No

## 2025-02-13 DIAGNOSIS — N97.9 FEMALE INFERTILITY: ICD-10-CM

## 2025-02-14 ENCOUNTER — LAB REQUISITION (OUTPATIENT)
Dept: LAB | Facility: HOSPITAL | Age: 40
End: 2025-02-14
Payer: COMMERCIAL

## 2025-02-14 ENCOUNTER — ANCILLARY PROCEDURE (OUTPATIENT)
Dept: ENDOCRINOLOGY | Facility: CLINIC | Age: 40
End: 2025-02-14

## 2025-02-14 DIAGNOSIS — N97.9 FEMALE INFERTILITY, UNSPECIFIED: ICD-10-CM

## 2025-02-14 DIAGNOSIS — N97.9 FEMALE INFERTILITY: ICD-10-CM

## 2025-02-14 LAB
ESTRADIOL SERPL-MCNC: 723 PG/ML
LH SERPL-ACNC: 1.8 IU/L
PROGEST SERPL-MCNC: 0.4 NG/ML

## 2025-02-14 PROCEDURE — 76857 US EXAM PELVIC LIMITED: CPT

## 2025-02-14 PROCEDURE — 82670 ASSAY OF TOTAL ESTRADIOL: CPT

## 2025-02-14 PROCEDURE — 83002 ASSAY OF GONADOTROPIN (LH): CPT

## 2025-02-14 PROCEDURE — 84144 ASSAY OF PROGESTERONE: CPT

## 2025-02-14 NOTE — PROGRESS NOTES
CYCLING NOTE    Here for US and/or lab monitoring; relevant findings reviewed. Patient is 39 years old. Patient of Dr. Valles. AMH- 0.8. Patient is here for IVF cycle #3 (#2 cancelled due to premature ovulation, 1st at [facility]). Protocol- Clomid mini stim with HGH. Patient is doing PGT-A testing. Patient is day 6 of stimulation.     NOTE: Patient ovulated prematurely last cycle.  Recommend monitoring LH levels at EACH visit.  Start antagonist when E2>300 or follicle >12 mm. Close monitoring at end of stim (daily or every other day- do not let her monitor longer than every other day).  -Plan ICSI with partner FDA tested sperm- Freeze all with PGT-A for possible future gestational carrier    Patient did not stay for discussion after monitoring,  Team will contact patient later today with results and plan.    Alistair Garcia  02/14/2025  8:27 AM    My chart message sent to patient with plan. Message sent to  for scheduling.   ALISTAIR GARCIA on 2/14/25 at 1:15 PM.

## 2025-02-16 ENCOUNTER — LAB REQUISITION (OUTPATIENT)
Dept: LAB | Facility: HOSPITAL | Age: 40
End: 2025-02-16
Payer: COMMERCIAL

## 2025-02-16 ENCOUNTER — ANCILLARY PROCEDURE (OUTPATIENT)
Dept: ENDOCRINOLOGY | Facility: CLINIC | Age: 40
End: 2025-02-16

## 2025-02-16 DIAGNOSIS — N97.9 FEMALE INFERTILITY: ICD-10-CM

## 2025-02-16 DIAGNOSIS — N97.9 FEMALE INFERTILITY, UNSPECIFIED: ICD-10-CM

## 2025-02-16 LAB
ESTRADIOL SERPL-MCNC: 1025 PG/ML
LH SERPL-ACNC: 3.7 IU/L
PROGEST SERPL-MCNC: 0.7 NG/ML

## 2025-02-16 PROCEDURE — 84144 ASSAY OF PROGESTERONE: CPT

## 2025-02-16 PROCEDURE — 82670 ASSAY OF TOTAL ESTRADIOL: CPT

## 2025-02-16 PROCEDURE — 83002 ASSAY OF GONADOTROPIN (LH): CPT

## 2025-02-16 PROCEDURE — 76857 US EXAM PELVIC LIMITED: CPT

## 2025-02-16 NOTE — PROGRESS NOTES
CYCLING NOTE    Here for US and/or lab monitoring; relevant findings reviewed. 39 year old patient of Dr. Root with AMH 0.8 is here to monitor for IVF #3 (x1 at [facility]; IVF #2 cancelled) after 10 days of stimulation.     Treatment protocol: Clomid mini-stim protocol. Clomid 100 mg CD3-7 with  overlap last 2 days, Add menopur with antagonist or as needed, +HGH throughout stim    Patient did not stay for discussion after monitoring,  Team will contact patient later today with results and plan.    IVAN DAY  02/16/2025  8:50 AM      [employer] message with the plan sent to the patient.  Francine Gutierrez 02/16/25 10:29 AM

## 2025-02-17 ENCOUNTER — SPECIALTY PHARMACY (OUTPATIENT)
Dept: PHARMACY | Facility: CLINIC | Age: 40
End: 2025-02-17

## 2025-02-17 ENCOUNTER — PHARMACY VISIT (OUTPATIENT)
Dept: PHARMACY | Facility: CLINIC | Age: 40
End: 2025-02-17
Payer: COMMERCIAL

## 2025-02-17 DIAGNOSIS — N97.9 FEMALE INFERTILITY: ICD-10-CM

## 2025-02-17 PROCEDURE — RXMED WILLOW AMBULATORY MEDICATION CHARGE

## 2025-02-17 RX ORDER — GANIRELIX ACETATE 250 UG/.5ML
250 INJECTION, SOLUTION SUBCUTANEOUS EVERY MORNING
Qty: 6 EACH | Refills: 0 | Status: SHIPPED | OUTPATIENT
Start: 2025-02-17 | End: 2025-02-21 | Stop reason: ALTCHOICE

## 2025-02-17 NOTE — PROGRESS NOTES
Requested Prescriptions     Signed Prescriptions Disp Refills    ganirelix (Orgalutran) 250 mcg/0.5 mL syringe injection 6 each 0     Sig: Inject 250 mcg (1 syringe) under the skin once daily as directed per provider.       Sent in refills to get medication through [facility] per protocol.     Other claim not processing. Had to send in renewal RX      Ania Pérez (Katie), Petra  [facility]  Clinical Pharmacy Specialist- Fertility   [facility], [place] Graham  [address] [place] [address]  Email: [email or web address]  Tel: [phone]       Fax: [phone]

## 2025-02-18 ENCOUNTER — SPECIALTY PHARMACY (OUTPATIENT)
Dept: PHARMACY | Facility: CLINIC | Age: 40
End: 2025-02-18

## 2025-02-18 ENCOUNTER — PHARMACY VISIT (OUTPATIENT)
Dept: PHARMACY | Facility: CLINIC | Age: 40
End: 2025-02-18
Payer: COMMERCIAL

## 2025-02-18 ENCOUNTER — ANCILLARY PROCEDURE (OUTPATIENT)
Dept: ENDOCRINOLOGY | Facility: CLINIC | Age: 40
End: 2025-02-18

## 2025-02-18 ENCOUNTER — LAB REQUISITION (OUTPATIENT)
Dept: LAB | Facility: HOSPITAL | Age: 40
End: 2025-02-18
Payer: COMMERCIAL

## 2025-02-18 DIAGNOSIS — N97.9 FEMALE INFERTILITY: ICD-10-CM

## 2025-02-18 DIAGNOSIS — N97.9 FEMALE INFERTILITY, UNSPECIFIED: ICD-10-CM

## 2025-02-18 LAB
ESTRADIOL SERPL-MCNC: 1755 PG/ML
LH SERPL-ACNC: 8 IU/L
PROGEST SERPL-MCNC: 1.3 NG/ML

## 2025-02-18 PROCEDURE — 84144 ASSAY OF PROGESTERONE: CPT

## 2025-02-18 PROCEDURE — 82670 ASSAY OF TOTAL ESTRADIOL: CPT

## 2025-02-18 PROCEDURE — 83002 ASSAY OF GONADOTROPIN (LH): CPT

## 2025-02-18 PROCEDURE — 76857 US EXAM PELVIC LIMITED: CPT

## 2025-02-18 PROCEDURE — RXMED WILLOW AMBULATORY MEDICATION CHARGE

## 2025-02-18 NOTE — PROGRESS NOTES
"CYCLING NOTE    Here for US and/or lab monitoring; relevant findings reviewed. 39 year old patient of Dr. Root with AMH 0.8 is here to monitor for IVF #3 (x1 at [facility]; IVF #2 cancelled due to premature ovulation) after 10 days of stimulation.      Treatment protocol: Clomid mini-stim protocol. Clomid 100 mg CD3-7 with  overlap last 2 days, Add menopur with antagonist or as needed, +HGH throughout stim  NOTE: Patient ovulated prematurely last cycle.  Recommend monitoring LH levels at EACH visit.  Start antagonist when E2>300 or follicle >12 mm. Close monitoring at end of stim (daily or every other day- do not let her monitor longer than every other day).  -Plan ICSI with partner FDA tested sperm- Freeze all with PGT-A for possible future gestational carrier     Patient did not stay for discussion after monitoring,  Team will contact patient later today with results and plan.    Pt sent mychart:  \"Hi Marixa,     There’s been a spot on my uterus that the ultrasound techs and I have been wondering about. I assumed it was my adenomyoma, but it seems to be changing with the last few ultrasounds. Like today it all of a sudden went from being hyperechoic to more of a fluid filled look. Could you ask about that in the meeting today? \"      Shanae Araujo  02/18/2025  7:39 AM    MyChart to pt with plan- will continue same doses of meds and come back tomorrow for possible trigger (may need one more day), will monitor LH and P4 closely. Providers reviewed possible adenomyosis on ultrasound, advised to send message to Dr. Root to make sure she is aware in case of future transfer (if pt carries herself).  Shanae Araujo 02/18/25 1:03 PM          "

## 2025-02-19 ENCOUNTER — ANCILLARY PROCEDURE (OUTPATIENT)
Dept: ENDOCRINOLOGY | Facility: CLINIC | Age: 40
End: 2025-02-19

## 2025-02-19 ENCOUNTER — LAB REQUISITION (OUTPATIENT)
Dept: LAB | Facility: HOSPITAL | Age: 40
End: 2025-02-19
Payer: COMMERCIAL

## 2025-02-19 DIAGNOSIS — N97.9 FEMALE INFERTILITY, UNSPECIFIED: ICD-10-CM

## 2025-02-19 DIAGNOSIS — N97.9 FEMALE INFERTILITY: ICD-10-CM

## 2025-02-19 LAB
ESTRADIOL SERPL-MCNC: 2170 PG/ML
LH SERPL-ACNC: 8.9 IU/L
PROGEST SERPL-MCNC: 1.2 NG/ML

## 2025-02-19 PROCEDURE — 83002 ASSAY OF GONADOTROPIN (LH): CPT

## 2025-02-19 PROCEDURE — 84144 ASSAY OF PROGESTERONE: CPT

## 2025-02-19 PROCEDURE — 76857 US EXAM PELVIC LIMITED: CPT

## 2025-02-19 PROCEDURE — 82670 ASSAY OF TOTAL ESTRADIOL: CPT

## 2025-02-19 RX ORDER — LEUPROLIDE ACETATE 1 MG/0.2ML
4 KIT SUBCUTANEOUS AS NEEDED
Start: 2025-02-19 | End: 2025-02-21 | Stop reason: ALTCHOICE

## 2025-02-19 NOTE — PROGRESS NOTES
CYCLING NOTE- IVF #3 (#2 cancelled)    Here for US and/or lab monitoring; relevant findings reviewed.      Here for US and/or lab monitoring; relevant findings reviewed. 39 year old patient of Dr. Root with AMH 0.8 is here to monitor for IVF #3 (x1 at [facility]; IVF #2 cancelled due to premature ovulation).  Day of Stim- 14 days with Clomid, 10 days of FSH.     Treatment protocol: Clomid mini-stim protocol. Clomid 100 mg CD3-7 with  overlap last 2 days, Add menopur with antagonist or as needed, +HGH throughout stim  NOTE: Patient ovulated prematurely last cycle.  Recommend monitoring LH levels at EACH visit.  Start antagonist when E2>300 or follicle >12 mm. Close monitoring at end of stim (daily or every other day- do not let her monitor longer than every other day).  -Plan ICSI with partner FDA tested sperm- Freeze all with PGT-A for possible future gestational carrier    Patient did not stay for discussion after monitoring,  Team will contact patient later today with results and plan.    Shanae Araujo  2025  8:49 AM    Pt aware of plan to trigger tonight at 0830pm for retrieval Friday at 0830 am. Will use partner FDA sperm. Will arrive at 0730am and aware to be NPO, will need a ride home and to arrive with no perfumes lotions or sprays.  Pt to take urine pregnancy test in the morning and call us if negative.  Original plan was for patient to do 10,000 of HCG and 2mg Lupron, pt states her lupron is , per Dr. Root and Dr. Gutierrez ok to just do 10,000 HCG.  All instructions sent via [employer].  Shanae Araujo 25 2:46 PM

## 2025-02-20 ENCOUNTER — PREP FOR PROCEDURE (OUTPATIENT)
Dept: ENDOCRINOLOGY | Facility: CLINIC | Age: 40
End: 2025-02-20
Payer: COMMERCIAL

## 2025-02-20 RX ORDER — HYDROCODONE BITARTRATE AND ACETAMINOPHEN 5; 325 MG/1; MG/1
1 TABLET ORAL ONCE AS NEEDED
Status: CANCELLED | OUTPATIENT
Start: 2025-02-20

## 2025-02-20 RX ORDER — KETOROLAC TROMETHAMINE 30 MG/ML
30 INJECTION, SOLUTION INTRAMUSCULAR; INTRAVENOUS ONCE AS NEEDED
Status: CANCELLED | OUTPATIENT
Start: 2025-02-20 | End: 2025-02-25

## 2025-02-20 RX ORDER — ACETAMINOPHEN 325 MG/1
650 TABLET ORAL ONCE AS NEEDED
Status: CANCELLED | OUTPATIENT
Start: 2025-02-20

## 2025-02-20 RX ORDER — HYDROMORPHONE HYDROCHLORIDE 0.2 MG/ML
0.2 INJECTION INTRAMUSCULAR; INTRAVENOUS; SUBCUTANEOUS
Status: CANCELLED | OUTPATIENT
Start: 2025-02-20

## 2025-02-20 RX ORDER — KETOROLAC TROMETHAMINE 30 MG/ML
30 INJECTION, SOLUTION INTRAMUSCULAR; INTRAVENOUS ONCE
Status: CANCELLED | OUTPATIENT
Start: 2025-02-20 | End: 2025-02-20

## 2025-02-20 RX ORDER — MORPHINE SULFATE 2 MG/ML
2 INJECTION, SOLUTION INTRAMUSCULAR; INTRAVENOUS AS NEEDED
Status: CANCELLED | OUTPATIENT
Start: 2025-02-20

## 2025-02-20 RX ORDER — ONDANSETRON HYDROCHLORIDE 2 MG/ML
4 INJECTION, SOLUTION INTRAVENOUS AS NEEDED
Status: CANCELLED | OUTPATIENT
Start: 2025-02-20

## 2025-02-20 RX ORDER — OXYCODONE AND ACETAMINOPHEN 5; 325 MG/1; MG/1
1 TABLET ORAL EVERY 6 HOURS PRN
Status: CANCELLED | OUTPATIENT
Start: 2025-02-20

## 2025-02-21 ENCOUNTER — ANESTHESIA EVENT (OUTPATIENT)
Dept: ENDOCRINOLOGY | Facility: CLINIC | Age: 40
End: 2025-02-21

## 2025-02-21 ENCOUNTER — HOSPITAL ENCOUNTER (OUTPATIENT)
Dept: ENDOCRINOLOGY | Facility: CLINIC | Age: 40
Discharge: HOME | End: 2025-02-21

## 2025-02-21 ENCOUNTER — ANESTHESIA (OUTPATIENT)
Dept: ENDOCRINOLOGY | Facility: CLINIC | Age: 40
End: 2025-02-21

## 2025-02-21 VITALS
DIASTOLIC BLOOD PRESSURE: 71 MMHG | OXYGEN SATURATION: 99 % | TEMPERATURE: 97.9 F | HEART RATE: 76 BPM | WEIGHT: 183.64 LBS | BODY MASS INDEX: 30.6 KG/M2 | SYSTOLIC BLOOD PRESSURE: 124 MMHG | RESPIRATION RATE: 18 BRPM | HEIGHT: 65 IN

## 2025-02-21 DIAGNOSIS — Z31.83 ENCOUNTER FOR ASSISTED REPRODUCTIVE FERTILITY CYCLE: ICD-10-CM

## 2025-02-21 LAB — PREGNANCY TEST URINE, POC: POSITIVE

## 2025-02-21 PROCEDURE — 3700000001 HC GENERAL ANESTHESIA TIME - INITIAL BASE CHARGE

## 2025-02-21 PROCEDURE — 7100000009 HC PHASE TWO TIME - INITIAL BASE CHARGE

## 2025-02-21 PROCEDURE — 89250 CULTR OOCYTE/EMBRYO <4 DAYS: CPT | Performed by: STUDENT IN AN ORGANIZED HEALTH CARE EDUCATION/TRAINING PROGRAM

## 2025-02-21 PROCEDURE — 89261 SPERM ISOLATION COMPLEX: CPT | Performed by: OBSTETRICS & GYNECOLOGY

## 2025-02-21 PROCEDURE — 2500000004 HC RX 250 GENERAL PHARMACY W/ HCPCS (ALT 636 FOR OP/ED): Performed by: NURSE ANESTHETIST, CERTIFIED REGISTERED

## 2025-02-21 PROCEDURE — 89272 EXTENDED CULTURE OF OOCYTES: CPT | Performed by: STUDENT IN AN ORGANIZED HEALTH CARE EDUCATION/TRAINING PROGRAM

## 2025-02-21 PROCEDURE — 3700000002 HC GENERAL ANESTHESIA TIME - EACH INCREMENTAL 1 MINUTE

## 2025-02-21 PROCEDURE — 2500000001 HC RX 250 WO HCPCS SELF ADMINISTERED DRUGS (ALT 637 FOR MEDICARE OP): Performed by: STUDENT IN AN ORGANIZED HEALTH CARE EDUCATION/TRAINING PROGRAM

## 2025-02-21 PROCEDURE — 89258 CRYOPRESERVATION EMBRYO(S): CPT | Performed by: STUDENT IN AN ORGANIZED HEALTH CARE EDUCATION/TRAINING PROGRAM

## 2025-02-21 PROCEDURE — 76948 ECHO GUIDE OVA ASPIRATION: CPT | Performed by: STUDENT IN AN ORGANIZED HEALTH CARE EDUCATION/TRAINING PROGRAM

## 2025-02-21 PROCEDURE — 7100000010 HC PHASE TWO TIME - EACH INCREMENTAL 1 MINUTE

## 2025-02-21 PROCEDURE — 89280 ASSIST OOCYTE FERTILIZATION: CPT | Performed by: STUDENT IN AN ORGANIZED HEALTH CARE EDUCATION/TRAINING PROGRAM

## 2025-02-21 PROCEDURE — 89290 BIOPSY OOCYTE POLAR BODY <=5: CPT | Performed by: STUDENT IN AN ORGANIZED HEALTH CARE EDUCATION/TRAINING PROGRAM

## 2025-02-21 PROCEDURE — 58970 RETRIEVAL OF OOCYTE: CPT | Performed by: STUDENT IN AN ORGANIZED HEALTH CARE EDUCATION/TRAINING PROGRAM

## 2025-02-21 RX ORDER — PROPOFOL 10 MG/ML
INJECTION, EMULSION INTRAVENOUS CONTINUOUS PRN
Status: DISCONTINUED | OUTPATIENT
Start: 2025-02-21 | End: 2025-02-21

## 2025-02-21 RX ORDER — ONDANSETRON HYDROCHLORIDE 2 MG/ML
4 INJECTION, SOLUTION INTRAVENOUS AS NEEDED
Status: DISCONTINUED | OUTPATIENT
Start: 2025-02-21 | End: 2025-02-22 | Stop reason: HOSPADM

## 2025-02-21 RX ORDER — DOXYCYCLINE 100 MG/10ML
INJECTION, POWDER, LYOPHILIZED, FOR SOLUTION INTRAVENOUS AS NEEDED
Status: DISCONTINUED | OUTPATIENT
Start: 2025-02-21 | End: 2025-02-21

## 2025-02-21 RX ORDER — HYDROMORPHONE HYDROCHLORIDE 2 MG/ML
0.2 INJECTION, SOLUTION INTRAMUSCULAR; INTRAVENOUS; SUBCUTANEOUS
Status: DISCONTINUED | OUTPATIENT
Start: 2025-02-21 | End: 2025-02-22 | Stop reason: HOSPADM

## 2025-02-21 RX ORDER — KETOROLAC TROMETHAMINE 30 MG/ML
30 INJECTION, SOLUTION INTRAMUSCULAR; INTRAVENOUS ONCE
Status: DISCONTINUED | OUTPATIENT
Start: 2025-02-21 | End: 2025-02-22 | Stop reason: HOSPADM

## 2025-02-21 RX ORDER — KETOROLAC TROMETHAMINE 30 MG/ML
30 INJECTION, SOLUTION INTRAMUSCULAR; INTRAVENOUS ONCE AS NEEDED
Status: DISCONTINUED | OUTPATIENT
Start: 2025-02-21 | End: 2025-02-22 | Stop reason: HOSPADM

## 2025-02-21 RX ORDER — MORPHINE SULFATE 2 MG/ML
2 INJECTION, SOLUTION INTRAMUSCULAR; INTRAVENOUS AS NEEDED
Status: DISCONTINUED | OUTPATIENT
Start: 2025-02-21 | End: 2025-02-22 | Stop reason: HOSPADM

## 2025-02-21 RX ORDER — FENTANYL CITRATE 50 UG/ML
INJECTION, SOLUTION INTRAMUSCULAR; INTRAVENOUS AS NEEDED
Status: DISCONTINUED | OUTPATIENT
Start: 2025-02-21 | End: 2025-02-21

## 2025-02-21 RX ORDER — OXYCODONE AND ACETAMINOPHEN 5; 325 MG/1; MG/1
1 TABLET ORAL EVERY 6 HOURS PRN
Status: DISCONTINUED | OUTPATIENT
Start: 2025-02-21 | End: 2025-02-22 | Stop reason: HOSPADM

## 2025-02-21 RX ORDER — ONDANSETRON HYDROCHLORIDE 2 MG/ML
INJECTION, SOLUTION INTRAVENOUS AS NEEDED
Status: DISCONTINUED | OUTPATIENT
Start: 2025-02-21 | End: 2025-02-21

## 2025-02-21 RX ORDER — MIDAZOLAM HYDROCHLORIDE 1 MG/ML
INJECTION, SOLUTION INTRAMUSCULAR; INTRAVENOUS AS NEEDED
Status: DISCONTINUED | OUTPATIENT
Start: 2025-02-21 | End: 2025-02-21

## 2025-02-21 RX ORDER — HYDROCODONE BITARTRATE AND ACETAMINOPHEN 5; 325 MG/1; MG/1
1 TABLET ORAL ONCE AS NEEDED
Status: DISCONTINUED | OUTPATIENT
Start: 2025-02-21 | End: 2025-02-22 | Stop reason: HOSPADM

## 2025-02-21 RX ORDER — ACETAMINOPHEN 325 MG/1
650 TABLET ORAL ONCE AS NEEDED
Status: COMPLETED | OUTPATIENT
Start: 2025-02-21 | End: 2025-02-21

## 2025-02-21 RX ADMIN — ONDANSETRON 4 MG: 2 INJECTION INTRAMUSCULAR; INTRAVENOUS at 08:45

## 2025-02-21 RX ADMIN — SODIUM CHLORIDE, POTASSIUM CHLORIDE, SODIUM LACTATE AND CALCIUM CHLORIDE: 600; 310; 30; 20 INJECTION, SOLUTION INTRAVENOUS at 08:15

## 2025-02-21 RX ADMIN — PROPOFOL 50 MG: 10 INJECTION, EMULSION INTRAVENOUS at 08:31

## 2025-02-21 RX ADMIN — DEXAMETHASONE SODIUM PHOSPHATE 4 MG: 4 INJECTION, SOLUTION INTRA-ARTICULAR; INTRALESIONAL; INTRAMUSCULAR; INTRAVENOUS; SOFT TISSUE at 08:34

## 2025-02-21 RX ADMIN — PROPOFOL 200 MCG/KG/MIN: 10 INJECTION, EMULSION INTRAVENOUS at 08:32

## 2025-02-21 RX ADMIN — FENTANYL CITRATE 100 MCG: 50 INJECTION, SOLUTION INTRAMUSCULAR; INTRAVENOUS at 08:29

## 2025-02-21 RX ADMIN — MIDAZOLAM 2 MG: 1 INJECTION INTRAMUSCULAR; INTRAVENOUS at 08:28

## 2025-02-21 RX ADMIN — DOXYCYCLINE 100 MG: 100 INJECTION, POWDER, LYOPHILIZED, FOR SOLUTION INTRAVENOUS at 08:29

## 2025-02-21 RX ADMIN — ACETAMINOPHEN 650 MG: 325 TABLET ORAL at 09:13

## 2025-02-21 ASSESSMENT — PAIN DESCRIPTION - LOCATION: LOCATION: ABDOMEN

## 2025-02-21 ASSESSMENT — PAIN SCALES - GENERAL
PAINLEVEL_OUTOF10: 4
PAINLEVEL_OUTOF10: 4
PAINLEVEL_OUTOF10: 0 - NO PAIN
PAINLEVEL_OUTOF10: 4
PAINLEVEL_OUTOF10: 4

## 2025-02-21 ASSESSMENT — PAIN - FUNCTIONAL ASSESSMENT
PAIN_FUNCTIONAL_ASSESSMENT: 0-10

## 2025-02-21 ASSESSMENT — COLUMBIA-SUICIDE SEVERITY RATING SCALE - C-SSRS
1. IN THE PAST MONTH, HAVE YOU WISHED YOU WERE DEAD OR WISHED YOU COULD GO TO SLEEP AND NOT WAKE UP?: NO
6. HAVE YOU EVER DONE ANYTHING, STARTED TO DO ANYTHING, OR PREPARED TO DO ANYTHING TO END YOUR LIFE?: NO
2. HAVE YOU ACTUALLY HAD ANY THOUGHTS OF KILLING YOURSELF?: NO

## 2025-02-21 NOTE — ANESTHESIA PREPROCEDURE EVALUATION
Patient: Marcy Putnam    Procedure Information       Date/Time: 02/21/25 5577    Scheduled providers: Jia Quiroz MD; Lele Hollins MD; KIRSTIN Tarango-CRNA    Procedure: EGG RETRIEVAL    [facility]; [facility]            Relevant Problems   No relevant active problems       Clinical information reviewed:                   NPO Detail:  No data recorded     Physical Exam    Airway  Mallampati: II  TM distance: >3 FB  Neck ROM: full     Cardiovascular   Rhythm: regular  Rate: normal     Dental    Pulmonary   Breath sounds clear to auscultation     Abdominal            Anesthesia Plan    History of general anesthesia?: yes  History of complications of general anesthesia?: no    ASA 2     MAC     intravenous induction   Anesthetic plan and risks discussed with patient.    Plan discussed with CRNA.

## 2025-02-21 NOTE — PROGRESS NOTES
IVF Procedure Area H&P    Ms. Marcy Putnam is a 39 y.o. F who presents for egg retrieval under anesthesia.   Interval history reviewed and affirmed as up to date.      MedHx:   Past Medical History:   Diagnosis Date    Anxiety disorder, unspecified     Anxiety    Infertility, female     Personal history of other diseases of the female genital tract     History of abnormal cervical Papanicolaou smear    Personal history of other specified conditions     History of headache    Raynaud's syndrome without gangrene     Raynauds disease    Renal dysplasia     Cystic dysplasia of one kidney       SurgHx:   Past Surgical History:   Procedure Laterality Date    CERVICAL BIOPSY  W/ LOOP ELECTRODE EXCISION  11/09/2016    Cervical Loop Electrosurgical Excision (LEEP)    COLPOSCOPY  11/09/2016    Colposcopy    KIDNEY SURGERY  11/07/2016    Kidney Surgery    OTHER SURGICAL HISTORY  11/07/2016    Nephrectomy    OTHER SURGICAL HISTORY  11/09/2016    Pyeloplasty       Meds:   Current Outpatient Medications on File Prior to Encounter   Medication Sig Dispense Refill    cholecalciferol (Vitamin D-3) 50 MCG (2000 UT) tablet Take 1 tablet (50 mcg) by mouth once daily.      co-enzyme Q-10 30 mg capsule Take 1 capsule (30 mg) by mouth once daily.      levothyroxine (Synthroid, Levoxyl) 75 mcg tablet Take 1 tablet (75 mcg) by mouth early in the morning.. Take on an empty stomach first thing in the morning, nothing to eat or drink besides water for 1 hour, as well as other medications. Take prenatals in the evening. 30 tablet 2    PNV no.153/FA/om3/dha/epa/fish (PRENATAL GUMMIES ORAL) Take 4 tablets by mouth once daily.      valACYclovir (Valtrex) 500 mg tablet Take 1 tablet (500 mg) by mouth once daily.      [DISCONTINUED] chorionic gonadotropin (Pregnyl) 10,000 unit injection Reconstitute according to instructions and inject 10,000 units (1 mL) under the skin as a one time dose, as directed per provider for trigger. 1 each 0     [DISCONTINUED] chorionic gonadotropin (Pregnyl) 10,000 unit injection Reconstitute according to instructions and inject 10,000 units (1 mL) under the skin as a one time dose, as directed per provider for trigger. 1 each 0    [DISCONTINUED] estradiol (Estrace) 2 mg tablet Take 1 tablet (2 mg) by mouth 2 times a day. 30 tablet 0    [DISCONTINUED] follitropin kiki (Gonal-f) 300/0.5 unit/mL pen injector injection Inject 150 Units under the skin once daily in the evening. Inject under the skin as directed by provider. 5 each 2    [DISCONTINUED] follitropin kiki (Gonal-f) 900/1.5 unit/mL pen injector pen injection Inject 250 Units under the skin once daily in the evening. Inject under the skin as directed by provider. 2 each 2    [DISCONTINUED] ganirelix (Orgalutran) 250 mcg/0.5 mL syringe injection Inject 250 mcg (1 syringe) under the skin once daily as directed per provider. 6 each 2    [DISCONTINUED] ganirelix (Orgalutran) 250 mcg/0.5 mL syringe injection Inject 250 mcg (1 syringe) under the skin once daily as directed per provider. 6 each 0    [DISCONTINUED] letrozole (Femara) 2.5 mg tablet Take 2 tablets (5 mg total) by mouth once daily.  Take 2 tablets by mouth cycle days 3-7 after negative urine pregnancy test every cycle before starting letrozole. 10 tablet 0    [DISCONTINUED] leuprolide (Lupron) 1 mg/0.2 mL injection Inject 0.8 mL (4 mg total) under the skin if needed (N/A).  Using an insulin syringe, draw up 80 units and inject under the skin as a one time dose, as directed per provider for trigger.      [DISCONTINUED] menotropins (Menopur) 75 unit recon soln injection Reconstitute and inject 75 Units under the skin once daily in the evening as directed. 8 each 2    [DISCONTINUED] progesterone (Prometrium) 100 mg capsule Insert 1 capsule (100 mg) into the vagina 2 times a day. Start testing with OPKs on CD 10. You will start Prometrium 4 days after a positive OPK (Patient not taking: Reported on 11/5/2024) 60  capsule 0    [DISCONTINUED] progesterone (Prometrium) 100 mg capsule Insert 1 capsule (100 mg) into the vagina 2 times a day. Starting 3 days after IUI 60 capsule 0    [DISCONTINUED] somatropin (Omnitrope) Draw up 1.14 mL of Bacteriostatic Water and inject into Omnitrope vial. Using an insulin syringe, draw up 25 units and inject under the skin once daily as directed per provider. 2 each 2     No current facility-administered medications on file prior to encounter.       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 All cephlasporins    Penicillins Hives and Rash       ROS: negative apart from what is indicated above    PE:   Gen: AA x 3   Resp: No labored breathing  Abdomen: soft, non-tender, non-distended    A/P: Ms. Marcy Putnam is a 39 y.o. F who presents for above procedure under anesthesia in the IVF procedure area. Okay to proceed with above stated procedure.    Jia Quiroz

## 2025-02-21 NOTE — PROGRESS NOTES
Patient ID: Marcy Putnam is a 39 y.o. female.    Egg Retrieval    Date/Time: 2/21/2025 8:30 AM    Performed by: Jia Quiroz MD  Authorized by: Poornima Root MD    Consent:     Consent obtained:  Verbal and written    Consent given by:  Patient    Procedure risks and benefits discussed: yes      Patient questions answered: yes      Patient agrees, verbalizes understanding, and wants to proceed: yes      Educational handouts given: yes      Instructions and paperwork completed: yes    Procedure:     Anesthesia:  MAC    Pelvic exam performed: Yes      Cervix cleaned and prepped: Yes      Speculum placed in vagina: Yes      Tenaculum applied to cervix: No      Ultrasound guidance: Yes      Left ovary:  Follicle present    Right ovary:  Follicle present    Pt. post-ovulatory: No      Speculum type: Tiesha      Retrieval method: transvaginal      Needle inserted: Yes      Ovaries aspirated: Yes      Free fluid in pelvis: No    Post-procedure:     Patient tolerated procedure well: yes    Comments:      Preop diagnosis: Female infertility   Post op diagnosis: Same  Assistant: Brenda    IV Fluids: 400 cc  EBL: 5 cc  UOP: Not recorded    Specimen: Oocytes  Complications: None    Number of Oocytes right ovary: 4   Ovarian access (right): Easy  Number of Oocytes left ovary: 6  Ovarian access (left): Easy  Endometrial thickness: tri, two myometrial cysts noted near fundus  Needle type: Single  Additional notes:     Jia Quiroz 02/21/25 8:31 AM

## 2025-02-21 NOTE — ANESTHESIA POSTPROCEDURE EVALUATION
Patient: Marcy Putnam    Procedure Summary       Date: 02/21/25 [facility]    Anesthesia Start: 0823 Anesthesia Stop: 0856    Procedure: EGG RETRIEVAL Diagnosis: Encounter for assisted reproductive fertility cycle    Scheduled Providers: Jia Quiroz MD; Lele Hollins MD; KIRSTIN Tarango-CRNA Responsible Provider: Lele Hollins MD    Anesthesia Type: MAC ASA Status: 2            Anesthesia Type: MAC    Vitals Value Taken Time   /71 02/21/25 0923   Temp 36.6 °C (97.9 °F) 02/21/25 0852   Pulse 76 02/21/25 0923   Resp 18 02/21/25 0923   SpO2 99 % 02/21/25 0923       Anesthesia Post Evaluation    Patient location during evaluation: bedside  Patient participation: complete - patient participated  Level of consciousness: awake  Pain management: adequate  Multimodal analgesia pain management approach  Airway patency: patent  Cardiovascular status: stable  Respiratory status: spontaneous ventilation and unassisted  Hydration status: acceptable  Postoperative Nausea and Vomiting: none  Comments: No significant PONV.        No notable events documented.    
EKG completed

## 2025-02-21 NOTE — DISCHARGE INSTRUCTIONS
[facility]  [address]. [place]  [address]   Tel: [phone]   Fax: [phone]    Home Going Instructions after Egg Retrieval:     Activity:   We do not recommend exercise on the day of or the day after your egg retrieval.   You can resume normal activities in 2-3 days, but please avoid exercise that involves jumping or bouncing.  If you are not having a fresh embryo transfer, you will likely start your period within 1-2 weeks and can resume all normal exercise at that time.   You may resume intercourse one week after your retrieval.     Anesthesia:  Drink small amounts of liquids initially and then slowly increase your intake of food on the day of your procedure. Drinking fluids will keep your bowels regular.   Avoid foods that are sweet, spicy or hard to digest today.  If you feel nauseated, rest your stomach for one hour, and then try drinking clear liquids again.  You may take a stool softener or miralax/milk of magnesia to help with constipation that may occur after anesthesia.  Please make sure a responsible adult is with you for at least 24 hours after surgery and do not drive or make important decisions during this time. Anesthesia may affect your judgment, coordination, and reaction time.    Follow up:  ALWAYS follow up with your primary nurse a few days after your procedure to check in and make sure you know what your next steps are.     When to call your provider:  Vaginal bleeding that is more than a normal period that doesn't taper down within 6 hours.  Saturating a sanitary pad in 1-2 hours.  Any clots the size of an egg or bigger.  Fever of 100.4 or higher with chills.  Unusual or foul smelling discharge.  Discomfort from abdominal distension.     Notify your Physician's office if you develop any signs of Ovarian Hyperstimulation (OHSS):    -Abdominal bloating   -Rapid weight gain of 5-10 lbs. in 1-2 days  -Swelling in  hands and feet  -Severe abdominal pain  -Shortness of breath   -Difficulty urinating or decreased urinary output   -Diarrhea    -Persistent nausea and vomiting  -Dizziness     These signs and symptoms can occur for up to 2 weeks following your oocyte retrieval. Call [phone] to speak with a Physician or Nurse if you have any concerns.    Yani DAN Star    7:35 AM    [facility]  [address]. [place], [place]     IVF LAB EMBRYO UPDATE PROTOCOL    The IVF Lab will call on the following days:    Retrieval Day: The doctor performing the procedure will tell you the total number of eggs collected. There will be no update from the IVF Lab on retrieval day.   Day 1: The IVF Lab will call you between 9:00-11:00 with an egg fertilization update.    Day 6: The IVF Lab will call you for an update on how your embryos have developed and how many have reached the blastocyst stage.    Day 6/7: You will receive an email from the IVF Lab with your summary report indicating the number of embryos that were able to be frozen. If you are doing genetic testing on your embryos you will receive a phone call regarding the number of embryos biopsied and frozen.    Yani L Star    7:35 AM    [facility]  [address]. [place]  [address]  Tel: [phone]   Fax: [phone]    Frozen Embryo Transfer Instructions    After your egg retrieval, follow up with your IVF nurse within 3-4 days Monday-Friday regarding the plan for your frozen embryo transfer (FET) cycle. Your IVF nurse will order and review with you the medications you will be using for the cycle.     You will be sent an email from [employer] to fill out your Frozen Embryo Treatment Plan at this time. This must be completed by the day you call the office with your period to set up the transfer cycle. If you do not have this paperwork completed  when you call for cycle set up, you may need to take a birth control pill, if appropriate, or wait until your following period. Completion of this paperwork is what allows us to call your embryos back from offsite storage for embryo transfer.    Please call the office on the first full flow day of your period to speak with your IVF nurse.    If the first day of your cycle begins over the weekend, please call the office Monday morning.   Depending on our embryo transfer schedule and the medication protocol your doctor prescribes, you may not start your embryo transfer cycle medications immediately after your period starts. If appropriate for you, your doctor may have you start birth control to help time your embryo transfer cycle which means there may be a short delay in starting your FET cycle.   If you did PGT testing of your embryos, you will not start a frozen embryo transfer cycle until we have received your results. It can take up to a few weeks to receive these results. If appropriate, we may have you take birth control from the time your period starts until the results are ready.      You will be set up for a lining check ultrasound and labs mid cycle, and given a tentative embryo transfer date. You may require multiple lining checks at the discretion of your provider. After your provider determines your lining is adequate, we will determine what date you will begin your progesterone support and confirm the day of your embryo transfer.     The embryology lab will contact you the day before with the time of your embryo transfer and when to arrive.     Please make sure to drink 20oz. of water one hour before your scheduled arrival time.   You do not need to be on bedrest following your embryo transfer. You may resume normal activity following embryo transfer.   You will have your blood drawn on the day of transfer to check a progesterone level and you will receive a call that afternoon with your results.   Do  not discontinue any of your medications unless instructed to by your provider.     Yani Graves RN    7:34 AM

## 2025-02-26 ENCOUNTER — SPECIALTY PHARMACY (OUTPATIENT)
Dept: PHARMACY | Facility: CLINIC | Age: 40
End: 2025-02-26

## 2025-02-26 DIAGNOSIS — E03.8 OTHER SPECIFIED HYPOTHYROIDISM: ICD-10-CM

## 2025-02-26 RX ORDER — LEVOTHYROXINE SODIUM 75 UG/1
75 TABLET ORAL DAILY
Qty: 30 TABLET | Refills: 2 | Status: SHIPPED | OUTPATIENT
Start: 2025-02-26

## 2025-02-26 NOTE — PROGRESS NOTES
[facility] Clinical Note  Patient Reassessment- Fertility End of Therapy Note     Introduction  Marcy Putnam is a 39 y.o. female who is on the specialty pharmacy service for management of: Fertility Core.    [facility] supplied medication:   Gonal-f 300 unit Redi-Ject pen, Menopur 75 unit vials, Ganirelix 250mcg syringes, Omnitrope 5.8mg vials, Pregnyl 10,000IU vial for trigger , and Leuprolide 1mg/0.2mL kit for trigger    Duration of therapy: 11 days of therapy     Treatment Start Date: 2/9/25  Treatment End Date: 2/19/25      IVF Meeting/Reassessment Tracking:       First Fill Assessment Completed: Yes - declined [facility] Education, sent written PDFs per request.  New IVF Cycle- cycle 3, clomid mini-stim protocol, start medications 2/9/25     IVF Baseline Starting Doses:   Clomid 100mg PO daily 2/5-2/9  Gonal-F 150 units subcutaneous daily starting 2/9  Omnitrope 25 units subcutaneous daily starting 2/9  Menopur to start with Antagonist   Monitoring again 2/12/25     IVF Meeting 2/12/25:  Continue Gonal-F 150 units subcutaneous daily   Continue Omnitrope 25 units subcutaneous daily   Start Menopur 75 units subcutaneous daily 2/12  Start Ganirelix 250mcg subcutaneous daily 2/12  Monitoring again 2/14/25     IVF Meeting 2/14/25:  Continue Gonal-F 150 units subcutaneous daily   Continue Omnitrope 25 units subcutaneous daily   Continue Menopur 75 units subcutaneous daily   Continue Ganirelix 250mcg subcutaneous daily   Monitoring again 2/16/25     IVF Meeting 2/16/25:  Continue Gonal-F 150 units subcutaneous daily   Continue Omnitrope 25 units subcutaneous daily   Continue Menopur 75 units subcutaneous daily   Continue Ganirelix 250mcg subcutaneous daily   Monitoring again 2/18/25     IVF Meeting 2/18/25:  Continue Gonal-F 150 units subcutaneous daily   Continue Omnitrope 25 units subcutaneous daily   Continue Menopur 75 units subcutaneous daily   Continue Ganirelix 250mcg subcutaneous daily    Monitoring again 2/19/25     IVF Meeting 2/19/25:  Trigger tonight with HCG 10,000 units and Lupron 40 units for egg retrieval 2/21/25.     The most recent encounter visit with the referring prescriber Dr. Quiroz on 2/21/25 was reviewed.  Pharmacy will continue to collaborate in the care of this patient with the referring prescriber.    Discussion    Marcy was contacted on 2/26/2025 at 3:23 PM for a pharmacy visit with encounter number [id number] from:   [facility]  [address] [place] [id number]  Dept: [phone]  Dept Fax: [phone]  Marcy consented to a/an Telephone visit, which was performed.    Efficacy  Patient has developed new symptoms of condition: No  Patient/caregiver feels medication is affecting the disease state: 10 oocytes retrieved, has 8 fertilized and waiting on PGT results per patient    Goals  Provided education on goals and possible outcomes of therapy:    Successful administration of IVF Medication(s)  Stimulate ovaries to produce multiple eggs for retrieval (Gonal-F, Follistim, Menopur, Low Dose HCG)  Suppress ovaries to prevent premature ovulation (Ganirelix/Cetrotide, Microdose & Long Lupron)  Trigger ovulation to prepare for egg retrieval (Lupron/Pregnyl)  Improve oocyte quality (Omnitrope)    All goals achieved through medication therapy.    Tolerance  Patient has experienced side effects from this medication: Yes - Omnitrope caused increased heart rate- tracked on fit bit  Changes to current therapy regimen: No- no dose changes to medications during IVF stimulation cycle.    The follow-up timeline was discussed. Every person responds to and reacts to therapy differently. Patient should be assessed for efficacy and tolerability in approximately: N/a, patient completed medication therapy.    Adherence  Patient Information  Informant: Self (Patient)  Demonstrates Understanding of Importance of Adherence: Yes  Does the patient have any  barriers to self-administration (including physical and mental?): No  Barriers to Self-Administration: none  Action Taken to Mitigate Barriers for Self-Administration: none  Support Network for Adherence: Family Member  Adherence Tools Used: Calendar  Medication Information  Medication:  (Gonal F, Menopur, Omnitrope, Ganirelix, HCG Trigger, Lupron Trigger)  Patient Reported Missed Doses in the Last 4 Weeks: 0  Estimated Medication Adherence Level: Good  Adherence Estimation Source: Provider (IVF Meeting)  Barriers to Adherence: No Problems identified  Action Taken to Address Barriers to Adherence: none  What concerns do you have regarding your medications?: none   The importance of adherence was discussed and patient/caregiver was advised to take the medication as prescribed by their provider. Encouraged patient/caregiver to call physician's office or specialty pharmacy if they have a question regarding a missed dose.    General Assessment  Changes to home medications, OTCs or supplements: Yes - taking lisinopril again until FET time  Current Outpatient Medications   Medication Sig Dispense Refill    cholecalciferol (Vitamin D-3) 50 MCG (2000 UT) tablet Take 1 tablet (50 mcg) by mouth once daily.      co-enzyme Q-10 30 mg capsule Take 1 capsule (30 mg) by mouth once daily.      levothyroxine (Synthroid, Levoxyl) 75 mcg tablet Take 1 tablet (75 mcg) by mouth early in the morning.. Take on an empty stomach first thing in the morning, nothing to eat or drink besides water for 1 hour, as well as other medications. Take prenatals in the evening. 30 tablet 2    PNV no.153/FA/om3/dha/epa/fish (PRENATAL GUMMIES ORAL) Take 4 tablets by mouth once daily.      valACYclovir (Valtrex) 500 mg tablet Take 1 tablet (500 mg) by mouth once daily.       No current facility-administered medications for this visit.     Reported new allergies: No  Reported new medical conditions: No  Additional monitoring reviewed: N/a  Is laboratory  follow up needed? No    Do you have any remaining fertility medications? Yes - reviewed proper storage of extra medications    Advised to contact the pharmacy if there are any changes to the patient's medication list, including prescriptions, OTC medications, herbal products, or supplements.    Impression/Plan  This patient has not been identified as high risk.   The following action was taken: N/A.    QOL/Patient Satisfaction  Rate your quality of life on scale of 1-10: 5 (Waiting on PGTA results, taking a short break before FET plans (FU 3/27). Has been hard on her body and needs a break.)  Rate your satisfaction with  Specialty Pharmacy on scale of 1-10: 10 - Completely satisfied    Provided contact information ([phone]) for [facility] and reviewed dispensing process, refill timeline and patient management follow up. Confirmed understanding of education conducted during assessment. All questions and concerns were addressed and patient/caregiver was encouraged to reach out for additional questions or concerns.    Based on the patient's diagnosis, medication list, progress towards goals, adherence, tolerance, and medication list, medication remains appropriate: Therapy remains appropriate (I attest)      Ania Pérez (Katie), PharmD  [facility]  Clinical Pharmacy Specialist- Fertility   [facility], [facility]  [address] [place] [address]  Email: [email or web address]  Tel: [phone]       Fax: [phone]

## 2025-03-13 DIAGNOSIS — N97.9 FEMALE INFERTILITY: ICD-10-CM

## 2025-03-13 DIAGNOSIS — Z01.83 ENCOUNTER FOR RH BLOOD TYPING: ICD-10-CM

## 2025-03-13 DIAGNOSIS — Z31.83 ENCOUNTER FOR ASSISTED REPRODUCTIVE FERTILITY CYCLE: ICD-10-CM

## 2025-03-13 NOTE — PROGRESS NOTES
[facility] [place] Intended Parent for Future Gestational Carrier ([facility]) Checklist- IVF #4    Age: 39 y.o.    Provider: Poornima Root MD  Primary RN: Shanae Araujo  Reasons for Treatment: Indication for Use of Gestational Carrier Diminished Ovarian Reserve and Chronic Kidney disease   Gestational Carrier: Future carrier, may carry herself to start  Last BMI  25 : 29.62 kg/m²       Past Medical History:   Diagnosis Date    Anxiety disorder, unspecified     Anxiety    Infertility, female     Personal history of other diseases of the female genital tract     History of abnormal cervical Papanicolaou smear    Personal history of other specified conditions     History of headache    Raynaud's syndrome without gangrene     Raynauds disease    Renal dysplasia     Cystic dysplasia of one kidney       Date Done Consultation Results/Comments   3/18/25 Medication Protocol Fertility Plan Update:  Patient will plan another IVF cycle with same protocol:   Luteal estrace Clomid mini stim  Clomid 100 mg CD3-7 with  overlap last 2 days  Add menopur with antagonist or as needed  +HGH throughout stim  NOTE: Recommend monitoring LH levels at EACH visit.  Start antagonist when E2>300 or follicle >12 mm. Close monitoring at end of stim (daily or every other day- do not let her monitor longer than every other day).  -Plan ICSI with partner FDA FRESH sperm and use ZYMOT-   -Freeze all with PGT-A  NOTE: Intent for this cycle is for patient to freeze for her own use.  However, may still decide to make embryos gestational care eligible for future use (if so will need repeat Viromeds for both patient and partner)   3/28/25 Authorization to Share [x]      5/10/24 IVF Consult IVF Consult: Yes  PGT-A? YES, Francheska kinsey to send   24 Consult r/t 3rd party    N/A  Waiver (In) Form Enroll in EngagedMD: Yes (Shanae Araujo RN)  Received and in chart: N/A   3/20/25  Waiver (Out) Form Enroll in EngagedMD: Yes (Shanae  MARI Araujo)  Received and in chart: Yes (Shanae Araujo RN)   12/12/24 IVF Information and Authorization (to be completed annually) Received and in chart: Yes (Shanae Araujo RN)   12/27/24 [employer] Packet [x]     3/13/25 Procedure Order Placed  [x]     5/31/24 Psych Consult Okay to proceed? Yes    Legal For future carrier   N/A Genetics Consult Okay to proceed? 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   4/24/24 Pap Smear Negative for intraepithelial lesion or malignancy   HPV: NEGATIVE   N/A Mammogram N/A   2024/2025 FDA Workup FDA Physical: completed; Date: 12/4/24 (within 6 mo)  FDA Questionnaire: completed; Date: 4/2/25 (within 6 mo)  Viromeds: to be completed at baseline  Summary of Records: provider sign off to be signed off after viromeds with baseline    Donor Number Add to spreadsheet [id number]  Egg Bank: N/A  Agency:  future GC   Date Done Male Labs (skip if using sperm donor)   Results/Comments  ROBERT THOMPSON (MRN: [id number])   12/30/24 ViroMeds Completed on 12/30 2022 Genetic Carrier Screening Myriad- completed at [facility]  Pos Biotinidase Deficiency   12/31/24 Sperm Freeze  # of vials: 4  TMS post thaw: 8.5    Emmanuelle Lovell, [place]  Shanae Araujo RN; [facility]  4 vials on site all Viromeds (eligible)     12/2024 FDA Workup FDA Physical: completed; Date: 12/6/24 (within 6 mo)  FDA Questionnaire: completed; Date: 4/2/25  (within 6 mo)  Viromeds: results verified; Date: TO BE DONE WITHIN 7 DAYS OF RETRIEVAL (within 7 d of sperm collection)  Summary of Records: provider sign off- TO BE SIGNED OFF ONCE VIROMEDS COMPLETED WITHIN 7 DAYS OF RETRIEVAL    Donor Number Add to  spreadsheet [id number]     Fertilization Plan PLANNING FRESH SAMPLE WITH ZYMOT, HAVE FROZEN FDA ELIGIBLE SAMPLE FOR BACK UP     MD Completion:  Ectopic Risk: No  Medically Complex: Yes  Cystic renal dysplasia    Okay to proceed with another retrieval cycle  Poornima Root 04/02/25 3:33 PM

## 2025-03-18 ENCOUNTER — TELEMEDICINE (OUTPATIENT)
Dept: ENDOCRINOLOGY | Facility: CLINIC | Age: 40
End: 2025-03-18
Payer: COMMERCIAL

## 2025-03-18 VITALS — HEIGHT: 65 IN | WEIGHT: 178 LBS | BODY MASS INDEX: 29.66 KG/M2

## 2025-03-18 DIAGNOSIS — Z13.1 SCREENING FOR DIABETES MELLITUS: Primary | ICD-10-CM

## 2025-03-18 PROCEDURE — 99215 OFFICE O/P EST HI 40 MIN: CPT | Performed by: OBSTETRICS & GYNECOLOGY

## 2025-03-18 PROCEDURE — 1036F TOBACCO NON-USER: CPT | Performed by: OBSTETRICS & GYNECOLOGY

## 2025-03-18 ASSESSMENT — PATIENT HEALTH QUESTIONNAIRE - PHQ9
2. FEELING DOWN, DEPRESSED OR HOPELESS: NOT AT ALL
1. LITTLE INTEREST OR PLEASURE IN DOING THINGS: NOT AT ALL
SUM OF ALL RESPONSES TO PHQ9 QUESTIONS 1 AND 2: 0

## 2025-03-18 ASSESSMENT — COLUMBIA-SUICIDE SEVERITY RATING SCALE - C-SSRS
2. HAVE YOU ACTUALLY HAD ANY THOUGHTS OF KILLING YOURSELF?: NO
6. HAVE YOU EVER DONE ANYTHING, STARTED TO DO ANYTHING, OR PREPARED TO DO ANYTHING TO END YOUR LIFE?: NO
1. IN THE PAST MONTH, HAVE YOU WISHED YOU WERE DEAD OR WISHED YOU COULD GO TO SLEEP AND NOT WAKE UP?: NO

## 2025-03-18 ASSESSMENT — PAIN SCALES - GENERAL: PAINLEVEL_OUTOF10: 0-NO PAIN

## 2025-03-18 NOTE — Clinical Note
See plan for IVF cycle in note. Can you check on [employer] pricing for them?  They are still trying to decide if they want to make next batch of embryos GC eligible

## 2025-03-18 NOTE — PROGRESS NOTES
"  Virtual or Telephone Consent: An interactive audio and video telecommunication system which permits real time communications between the patient (at the originating site) and provider (at the distant site) was utilized to provide this telehealth service and Verbal consent was requested and obtained from Marcy Putnam on this date, 25 for a telehealth visit.    Follow Up Visit HPI    Patient is a 39 y.o.  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     Interval history:  Patient success with mini-stim protocol  Has 1 euploid embryo frozen--> FDA eligible    Has questions about the sperm component       Prior notes reviewed and updated:  Interval history  Had cancelled IVF cycle- Luteal estrace Clomid flare /menopur 150  Only 1 dominant follicle developed and patient ovulated despite ganirelix    Interval history:  Had HSG - broad-based uterus with no filling defects noted and bilateral tubal patency     -Did natural cycle with TIC- also luteal phase progesterone, no pregnancy.  Has only done 1 (could try last month due to HSG timing).         Prior note below reviewed:  Previously has seen and and Earnestine Linn to discuss gestational carrier due to CKD.  She saw MFM and now desires to consider pregnancy herself.     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 Last creatinine 0.97, P:C 100. \"    \"In summary, I see no overt contraindications to assisted reproduction. We discussed that she has an " "increased risk of fetal growth restriction, preeclampsia (including periviable preeclampsia), and permanent worsening of her renal function (0-10%) but pregnancy is NOT overtly contraindicated. Thank you for allowing me to participate in the care of your patient. Please do not hesitate to contact me with any further questions. \"    After this counseling, patient and partner decided to try to conceive naturally.  Attempted pregnancy naturally in August and had biochemical pregnancy--> positive home pregnancy test and delayed/heavy period.     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    Has been using condoms for contraception.       Testing to date:    Latest Reference Range & Units Most Recent   Hemoglobin A1C 4.3 - 5.6 % 5.3 (E)  24 07:50   Thyroid Stimulating Hormone 0.44 - 3.98 mIU/L 2.66  10/3/19 09:24   GLUCOSE 74 - 99 mg/dL 93  10/3/19 09:24   Estimated Average Glucose mg/dL 105 (E)  24 07:50   (E): External lab result  Other: [facility] labs  Creatinine 0.97 (2024)  AMH 0.84 (2024)  TSH 2024 2.410 -- Currently on 75 mcg synthroid     Had had STDs done through FDA lab for Viromeds  10/2023 and 3/2024    Status of fallopian tubes: Bilateral tubal patency      Saline Ultrasound: Not assessesd  TVUS and MRI suggestive of adenomyosis     Hysteroscopy:  Not assessed       Partner SA: Normal  Name: Rj Sanderson   :  1987  Occupation:   Prior paternity: No  Pertinent history:   SA in past year: Yes, normal at [facility]    Sperm Concentration  >=15.00 M/mL 45.50   Total Sperm Count  M 259.35   % Motile Sperm (%AK + %NP)  >=40 % 52   Forward Progression 2 = Poor to moderate, erratic   Total Motile Sperm  M 134.86   % Normal Forms, WHO (5th ed.)  >=4 % 4     -Had STD " testing through [employer] 11/22/2023- scanned into his chart    Genetic screening Hx  Patient: Myriad 2bP: testing done at [facility] negaitve per patient  Sperm Source: partner  Sperm: Myriad 2bP: Negative per patient  Need [facility] records to review  Need to review F records  Yes pt and partner had carrier screening at [facility] also saw Genetic counselor at [facility] to assess if pt's kidney disease was genetic and all testing was negative per pt   -Update:  Carrier screening scanned into patients' chart,negaive (invitae)     Treatment to date:   Fertility Cycles       Cycle Name Treatment Start Date Type Outcome    IVF Cycle #4 (#1 at [facility], #2 cancelled)  ICSI, Retrieval, Cryopreservation (Embryo), Biopsy for PGT Active    IVF Cycle #3 (x1 [facility]) #2 cancelled 02/04/2025 ICSI, Retrieval, Cryopreservation (Embryo), Biopsy for PGT No Transfer    IVF Cycle #2 (x1 at [facility]) - CANCELLED 01/06/2025 ICSI, Retrieval, Cryopreservation (Embryo), Biopsy for PGT Canceled    Letrozole Monitor/TIC #2 (only 1 IUI) 12/06/2024 Timed War No Pregnancy    Letrozole IUI #1 11/04/2024 IUI No Pregnancy    Natural, LP Prometrium/ TIC 09/12/2024 Timed War No Pregnancy          Past Infertility Treatments:  See below  1 Cycle of IVF at [facility]- 3/2024  Natural start  Antagonist, , 5 units low dose hcg   5 oocytes  3 mature  2 fertilized  2 Blasts,   PGT-A   1 monosomy 16  1 monosomy 18 XYY    IVF #2 ()--> Clomid flare /Menopur 250-->Cancelled for poor response and premature ovulation    IVF #3 ()-->  Luteal estrace Clomid mini stim with  and +HGH (75 menopur added with antagonist)  Peak E2 2170--> HCG trigger  10 eggs retrieved, 8 mature--> ICSI,  7 2pn -->1 blast frozen (4AA Day 6)--> Euploid on PGT testing          PMH:  Chronic Kidney Disease   ADHD   IBS  Lisinopril 5 mg PO daily--> Stopped end of July   Synthroid 75 mcg PO daily   Took Growth hormone shots as a child Age 5 until 15 years old   Past Medical History:  "  Diagnosis Date    Anxiety disorder, unspecified     Anxiety    Infertility, female     Personal history of other diseases of the female genital tract     History of abnormal cervical Papanicolaou smear    Personal history of other specified conditions     History of headache    Raynaud's syndrome without gangrene     Raynauds disease    Renal dysplasia     Cystic dysplasia of one kidney     Past Surgical History:   Procedure Laterality Date    CERVICAL BIOPSY  W/ LOOP ELECTRODE EXCISION  2016    Cervical Loop Electrosurgical Excision (LEEP)    COLPOSCOPY  2016    Colposcopy    KIDNEY SURGERY  2016    Kidney Surgery    OTHER SURGICAL HISTORY  2016    Nephrectomy    OTHER SURGICAL HISTORY  2016    Pyeloplasty     Current Outpatient Medications on File Prior to Visit   Medication Sig Dispense Refill    cholecalciferol (Vitamin D-3) 50 MCG (2000 UT) tablet Take 1 tablet (50 mcg) by mouth once daily.      co-enzyme Q-10 30 mg capsule Take 1 capsule (30 mg) by mouth once daily.      levothyroxine (Synthroid, Levoxyl) 75 mcg tablet Take 1 tablet (75 mcg) by mouth early in the morning.. Take on an empty stomach first thing in the morning, nothing to eat or drink besides water for 1 hour, as well as other medications. Take prenatals in the evening. 30 tablet 2    PNV no.153/FA/om3/dha/epa/fish (PRENATAL GUMMIES ORAL) Take 4 tablets by mouth once daily.      valACYclovir (Valtrex) 500 mg tablet Take 1 tablet (500 mg) by mouth once daily.       No current facility-administered medications on file prior to visit.       BMI:   BMI Readings from Last 1 Encounters:   25 29.62 kg/m²     VITALS:  Ht 1.651 m (5' 5\")   Wt 80.7 kg (178 lb)   LMP 2025 (Exact Date)   BMI 29.62 kg/m²   LMP: Patient's last menstrual period was 2025 (exact date).    ASSESSMENT   39 y.o.  female with  years, Diminished Ovarian Reserve and Chronic Kidney Disease  and the following pertinent medical " issues: CKD, s/p MFM consult.    COUNSELING  We reviewed the disappointing cycle outcome.      PLAN  Orders Placed This Encounter   Procedures    Hemoglobin A1C       FOLLOW UP   Consults:  None, already had MFM consult, stopped lisonopril  Engaged MD  Take prenatal vitamins, vitamin D 2000 IUs daily  Discussed that treatment cannot proceed until checklist items are complete.   Additional testing for BMI < 18 or > 40: NA.  Chart to primary nurse for care coordination and patient check list/education.    MD Completion:  Ectopic Risk: No  Medically Complex: Yes  Outstanding boarding pass items:   FDA labs if indicated    Fertility Plan Update:  Patient will plan another IVF cycle with same protocol:   Luteal estrace Clomid mini stim  Clomid 100 mg CD3-7 with  overlap last 2 days  Add menopur with antagonist or as needed  +HGH throughout stim  NOTE: Recommend monitoring LH levels at EACH visit.  Start antagonist when E2>300 or follicle >12 mm. Close monitoring at end of stim (daily or every other day- do not let her monitor longer than every other day).  -Plan ICSI with partner FDA FRESH sperm and use ZYMOT-   -Freeze all with PGT-A  NOTE: Intent for this cycle is for patient to freeze for her own use.  However, may still decide to make embryos gestational care eligible for future use (if so will need repeat Viromeds for both patient and partner)    2. Transfer plan  After retrieval patient plans embryos transfer  Recommend Lupron/Aygestin/Letrozole x 2 months prior to FET  Programmed FET with estrace patches and IM P4 75 mg  Baby ASA 81 mg to start with FET      Poornima Root 03/18/25 2:15 PM

## 2025-03-25 ENCOUNTER — APPOINTMENT (OUTPATIENT)
Dept: ENDOCRINOLOGY | Facility: CLINIC | Age: 40
End: 2025-03-25
Payer: COMMERCIAL

## 2025-03-25 DIAGNOSIS — Z01.812 PRE-PROCEDURAL LABORATORY EXAMINATION: ICD-10-CM

## 2025-03-25 DIAGNOSIS — N97.9 FEMALE INFERTILITY: ICD-10-CM

## 2025-03-26 ENCOUNTER — LAB REQUISITION (OUTPATIENT)
Dept: LAB | Facility: HOSPITAL | Age: 40
End: 2025-03-26
Payer: COMMERCIAL

## 2025-03-26 DIAGNOSIS — N97.9 FEMALE INFERTILITY, UNSPECIFIED: ICD-10-CM

## 2025-03-26 LAB — PROGEST SERPL-MCNC: 9.7 NG/ML

## 2025-03-26 PROCEDURE — RXMED WILLOW AMBULATORY MEDICATION CHARGE

## 2025-03-26 PROCEDURE — 84144 ASSAY OF PROGESTERONE: CPT

## 2025-03-26 RX ORDER — CLOMIPHENE CITRATE 50 MG/1
100 TABLET ORAL DAILY
Qty: 10 TABLET | Refills: 0 | Status: SHIPPED | OUTPATIENT
Start: 2025-03-26 | End: 2026-03-26

## 2025-03-26 RX ORDER — LEUPROLIDE ACETATE 1 MG/0.2ML
4 KIT SUBCUTANEOUS AS NEEDED
Qty: 1 KIT | Refills: 0 | Status: SHIPPED | OUTPATIENT
Start: 2025-03-26

## 2025-03-26 RX ORDER — CHORIONIC GONADOTROPIN 10000 UNIT
10000 KIT INTRAMUSCULAR ONCE AS NEEDED
Qty: 1 EACH | Refills: 0 | Status: SHIPPED | OUTPATIENT
Start: 2025-03-26

## 2025-03-26 NOTE — PROGRESS NOTES
Patient went to  lab for P4 level to confirm ovulation for luteal estrace start for IVF Cycle #4.    Component      Latest Ref Rng 3/26/2025   Progesterone      ng/mL 9.7        Shanae Araujo 03/26/25 12:38 PM      P4 levels reviewed.    Ovulatory progesterone noted, ok to start luteal estrace.    Francine Gutierrez 03/26/25 12:43 PM    Notified pt that P4 level is ovulatory and to start her estrace today and continue twice a day and call with menses for IVF Baseline.  Will complete FDA questionnaires next week when pt is back from vacation, will do viromeds at baseline and partner to do fresh collection so will need viromeds within 7 days of retrieval.   Ordered meds to SP.  Asked pt if the lab also collected Type and Screen (needs updated) and A1C Dr. Root ordered.  Shanae Araujo 03/26/25 1:10 PM

## 2025-03-27 ENCOUNTER — APPOINTMENT (OUTPATIENT)
Dept: ENDOCRINOLOGY | Facility: CLINIC | Age: 40
End: 2025-03-27
Payer: COMMERCIAL

## 2025-04-01 DIAGNOSIS — N97.9 FEMALE INFERTILITY: ICD-10-CM

## 2025-04-01 PROCEDURE — RXMED WILLOW AMBULATORY MEDICATION CHARGE

## 2025-04-01 NOTE — PROGRESS NOTES
Requested Prescriptions     Signed Prescriptions Disp Refills    follitropin beta (Follistim AQ) 600 unit/0.72 mL injection 3 each 2     Sig: Inject 150 Units under the skin once daily in the evening. Inject under the skin as directed by provider    pen injector, for follitropin, pen injector 1 each 0     Sig: Inject 1 Pen under the skin once daily. Use as directed to administer Follistrim     Sent in Follistim in place of Gonal-F RX per protocol to [facility]. Follistim (follitropin beta) is preferred FSH agent for self-pay as of 4/1/25.       Ania Pérez (Katie), PharmD  [facility]  Clinical Pharmacy Specialist- Fertility   [facility], [facility]  [address] [place] [address]  Email: [email or web address]  Tel: [phone]       Fax: [phone]

## 2025-04-02 ENCOUNTER — TELEPHONE (OUTPATIENT)
Dept: ENDOCRINOLOGY | Facility: CLINIC | Age: 40
End: 2025-04-02
Payer: COMMERCIAL

## 2025-04-02 ENCOUNTER — LAB (OUTPATIENT)
Dept: LAB | Facility: HOSPITAL | Age: 40
End: 2025-04-02
Payer: COMMERCIAL

## 2025-04-02 LAB
ABO GROUP (TYPE) IN BLOOD: NORMAL
ANTIBODY SCREEN: NORMAL
RH FACTOR (ANTIGEN D): NORMAL

## 2025-04-02 PROCEDURE — 86901 BLOOD TYPING SEROLOGIC RH(D): CPT

## 2025-04-02 PROCEDURE — 86850 RBC ANTIBODY SCREEN: CPT

## 2025-04-02 PROCEDURE — 86900 BLOOD TYPING SEROLOGIC ABO: CPT

## 2025-04-02 NOTE — TELEPHONE ENCOUNTER
Called patient to touch base and to do FDA Questionnaire.    FDA Questionnaire completed.  Pt went to lab today for Type and Screen and A1C.   Pt aware she will do viromeds at baseline and we will monitor LH levels closely once she starts her stimulation.  Pt expecting menses Friday, had a large LH surge on 3/20. Told pt if she gets her period Friday AM we could maybe bring her in for baseline or could just continue estrace and baseline Monday (and take last estrace Sunday). Told pt she wouldn't start Clomid until day 3 or 4 anyways. Told pt to touch base with us Friday if no menses yet so she can schedule for Monday since she would likely get menses over the weekend.    Pt will contact [facility] for medication delivery.     Plan is for partner to do fresh collection with [employer] and will do viromeds within 7 days of retrieval.     Will get BP signed off.  Shanae Araujo 04/02/25 10:48 AM

## 2025-04-03 LAB
EST. AVERAGE GLUCOSE BLD GHB EST-MCNC: 108 MG/DL
EST. AVERAGE GLUCOSE BLD GHB EST-SCNC: 6 MMOL/L
HBA1C MFR BLD: 5.4 % OF TOTAL HGB

## 2025-04-04 ENCOUNTER — TELEPHONE (OUTPATIENT)
Dept: ENDOCRINOLOGY | Facility: CLINIC | Age: 40
End: 2025-04-04
Payer: COMMERCIAL

## 2025-04-04 ENCOUNTER — SPECIALTY PHARMACY (OUTPATIENT)
Dept: PHARMACY | Facility: CLINIC | Age: 40
End: 2025-04-04

## 2025-04-04 ENCOUNTER — PHARMACY VISIT (OUTPATIENT)
Dept: PHARMACY | Facility: CLINIC | Age: 40
End: 2025-04-04
Payer: COMMERCIAL

## 2025-04-04 DIAGNOSIS — N97.9 FEMALE INFERTILITY: ICD-10-CM

## 2025-04-04 PROCEDURE — RXMED WILLOW AMBULATORY MEDICATION CHARGE

## 2025-04-04 RX ORDER — GANIRELIX ACETATE 250 UG/.5ML
250 INJECTION, SOLUTION SUBCUTANEOUS EVERY MORNING
Qty: 5 EACH | Refills: 2 | Status: SHIPPED | OUTPATIENT
Start: 2025-04-04

## 2025-04-04 NOTE — TELEPHONE ENCOUNTER
Returned patient's call.   Patient on estrace for clomid mini stim protocol.  Patient will come in for baseline and viromeds on Monday 4/7.  Patient reminded to stay on estrace until baseline appointment.     Inocencia Walters 04/04/25 9:44 AM

## 2025-04-07 ENCOUNTER — ANCILLARY PROCEDURE (OUTPATIENT)
Dept: ENDOCRINOLOGY | Facility: CLINIC | Age: 40
End: 2025-04-07

## 2025-04-07 ENCOUNTER — LAB REQUISITION (OUTPATIENT)
Dept: LAB | Facility: HOSPITAL | Age: 40
End: 2025-04-07
Payer: COMMERCIAL

## 2025-04-07 DIAGNOSIS — Z01.812 ENCOUNTER FOR PREPROCEDURAL LABORATORY EXAMINATION: ICD-10-CM

## 2025-04-07 DIAGNOSIS — N97.9 FEMALE INFERTILITY: ICD-10-CM

## 2025-04-07 DIAGNOSIS — N97.9 FEMALE INFERTILITY, UNSPECIFIED: ICD-10-CM

## 2025-04-07 DIAGNOSIS — Z01.812 PRE-PROCEDURAL LABORATORY EXAMINATION: ICD-10-CM

## 2025-04-07 LAB
ERYTHROCYTE [DISTWIDTH] IN BLOOD BY AUTOMATED COUNT: 12 % (ref 11.5–14.5)
ESTRADIOL SERPL-MCNC: 353 PG/ML
HCT VFR BLD AUTO: 41.5 % (ref 36–46)
HGB BLD-MCNC: 13.9 G/DL (ref 12–16)
MCH RBC QN AUTO: 28.4 PG (ref 26–34)
MCHC RBC AUTO-ENTMCNC: 33.5 G/DL (ref 32–36)
MCV RBC AUTO: 85 FL (ref 80–100)
NRBC BLD-RTO: 0 /100 WBCS (ref 0–0)
PLATELET # BLD AUTO: 324 X10*3/UL (ref 150–450)
RBC # BLD AUTO: 4.9 X10*6/UL (ref 4–5.2)
WBC # BLD AUTO: 9.7 X10*3/UL (ref 4.4–11.3)

## 2025-04-07 PROCEDURE — 76857 US EXAM PELVIC LIMITED: CPT

## 2025-04-07 PROCEDURE — 76857 US EXAM PELVIC LIMITED: CPT | Performed by: OBSTETRICS & GYNECOLOGY

## 2025-04-07 PROCEDURE — 82670 ASSAY OF TOTAL ESTRADIOL: CPT

## 2025-04-07 PROCEDURE — 85027 COMPLETE CBC AUTOMATED: CPT

## 2025-04-07 NOTE — PROGRESS NOTES
DEANN NOTE - IVF STIM BASELINE  Patient presents for baseline ultrasound and/or labs.    Treatment protocol: Clomid mini stim, Clomid 100 mg CD3-7 with  overlap last 2 days, Add menopur with antagonist or as needed +HGH throughout stim  NOTE: Recommend monitoring LH levels at EACH visit.  Start antagonist when E2>300 or follicle >12 mm. Close monitoring at end of stim (daily or every other day- do not let her monitor longer than every other day).  -Plan ICSI with partner FDA FRESH sperm and use ZYMOT  -Freeze all with PGT-A  NOTE: Intent for this cycle is for patient to freeze for her own use.  However, may still decide to make embryos gestational care eligible for future use (if so will need repeat Viromeds for both patient and partner)  LMP 4/4  Lead in: Luteal estrace  Start date for lead in: 3/26/25  Last estrace/OCP date: 4/7/25?  PGT-A/M? Yes PGT-A, [employer], order sent.  PGT order scanned into Epic, confirmed by: STARLA on 3/14/25  Plan to freeze: embryos    [employer] forms/out waiver complete:  Yes    Does patient have medications onhand?  Yes  Pharmacy: [facility]    Boarding pass signed off:  Yes    CBC reviewed by MD?  Yes    Date of Female STDs: viromeds today 4/7/25    Date of Male STDs: 12/2024- will do viromeds again within 7 days of retrieval    Medically complex on boarding pass:   yes    If indicated, is PAT consult complete?  N/A    SPERM:  partner  Fresh with Frozen Backup  Number of vials confirmed: 4 vials on site on 4/7/25 by HAM  **WILL DO VIROMEDS WITHIN 7 DAYS OF RETRIEVAL, PLANNING FRESH COLLECTION WITH ZYMOT, HAVE FROZEN FDA ELIGIBLE SAMPLE FOR BACK UP    No results found for this or any previous visit (from the past 96 hours).    Additional details:   Shanae Araujo  04/07/2025  8:29 AM    Agree with above    Dominic Yanez 04/07/25 12:49 PM      MyChart to patient with plan, will start clomid 100mg tonight, for 5 days, then will start 150 FSH on Thursday with her HGH and come back  Sunday.  Shanae Araujo 04/07/25 1:59 PM

## 2025-04-12 DIAGNOSIS — N97.9 FEMALE INFERTILITY: ICD-10-CM

## 2025-04-13 ENCOUNTER — ANCILLARY PROCEDURE (OUTPATIENT)
Dept: ENDOCRINOLOGY | Facility: CLINIC | Age: 40
End: 2025-04-13

## 2025-04-13 ENCOUNTER — LAB REQUISITION (OUTPATIENT)
Dept: LAB | Facility: HOSPITAL | Age: 40
End: 2025-04-13
Payer: COMMERCIAL

## 2025-04-13 DIAGNOSIS — N97.9 FEMALE INFERTILITY, UNSPECIFIED: ICD-10-CM

## 2025-04-13 DIAGNOSIS — N97.9 FEMALE INFERTILITY: ICD-10-CM

## 2025-04-13 LAB
ESTRADIOL SERPL-MCNC: 251 PG/ML
LH SERPL-ACNC: 8.3 IU/L

## 2025-04-13 PROCEDURE — 82670 ASSAY OF TOTAL ESTRADIOL: CPT

## 2025-04-13 PROCEDURE — 83002 ASSAY OF GONADOTROPIN (LH): CPT

## 2025-04-13 PROCEDURE — 76857 US EXAM PELVIC LIMITED: CPT

## 2025-04-13 NOTE — PROGRESS NOTES
CYCLING NOTE    Here for US and/or lab monitoring; relevant findings reviewed. Patient is 39 years old. Patient of Dr. Root. AMH- 0.84. Patient is here for IVF Cycle #4 (#1 at [facility], #2 cancelled). Protocol- Clomid mini stim  Clomid 100 mg CD3-7 with  overlap last 2 days  Add menopur with antagonist or as needed  +HGH throughout stim  NOTE: Recommend monitoring LH levels at EACH visit.  Start antagonist when E2>300 or follicle >12 mm. Close monitoring at end of stim (daily or every other day- do not let her monitor longer than every other day).  -Plan ICSI with partner FDA FRESH sperm and use ZYMOT-   -Freeze all with PGT-A  Patient is day 4 of stimulation.     Patient stayed for nurse visit. Pain is 0/10  Team will contact patient later today with results and plan.    Alistair Garcia  04/13/2025  8:41 AM    My [employer] message sent to patient with plan. Message sent to  for scheduling.   ALISTAIR GARCIA on 4/13/25 at 10:27 AM.

## 2025-04-14 ENCOUNTER — TELEPHONE (OUTPATIENT)
Dept: ENDOCRINOLOGY | Facility: CLINIC | Age: 40
End: 2025-04-14
Payer: COMMERCIAL

## 2025-04-14 DIAGNOSIS — N97.9 FEMALE INFERTILITY: ICD-10-CM

## 2025-04-14 NOTE — TELEPHONE ENCOUNTER
Reason for call: Call Back  Notes: Pt is scheduled for monitoring appointment tomorrow at 7:15. She was instructed to take her ganirelix tomorrow around 7. She wants to verify if she should take it prior to her appointment or wait until after.

## 2025-04-14 NOTE — TELEPHONE ENCOUNTER
Attempted to return patient call. Detailed VM left for patient. Patient instructed she should start Ganirelix tomorrow AM and choose a consistent time between 6-9 AM. Patient instructed if she would like to choose the time of 7 AM, it would be appropriate to give the injections prior to her appointment as it is in her plan to start tomorrow. Patient instructed to call office back with any further questions/concerns.   ALISTAIR GUZMAN on 4/14/25 at 8:44 AM.

## 2025-04-15 ENCOUNTER — LAB REQUISITION (OUTPATIENT)
Dept: LAB | Facility: HOSPITAL | Age: 40
End: 2025-04-15
Payer: COMMERCIAL

## 2025-04-15 ENCOUNTER — ANCILLARY PROCEDURE (OUTPATIENT)
Dept: ENDOCRINOLOGY | Facility: CLINIC | Age: 40
End: 2025-04-15

## 2025-04-15 DIAGNOSIS — N97.9 FEMALE INFERTILITY: ICD-10-CM

## 2025-04-15 DIAGNOSIS — N97.9 FEMALE INFERTILITY, UNSPECIFIED: ICD-10-CM

## 2025-04-15 LAB
ESTRADIOL SERPL-MCNC: 468 PG/ML
LH SERPL-ACNC: 2.4 IU/L

## 2025-04-15 PROCEDURE — 76857 US EXAM PELVIC LIMITED: CPT

## 2025-04-15 PROCEDURE — 83002 ASSAY OF GONADOTROPIN (LH): CPT

## 2025-04-15 PROCEDURE — 82670 ASSAY OF TOTAL ESTRADIOL: CPT

## 2025-04-15 NOTE — PROGRESS NOTES
CYCLING NOTE  Cycle #: 4  Reason For Treatment: ANNI, indication for GC  Protocol: Clomid mini stim, Clomid 100 mg CD3-7 with  overlap last 2 days, Add menopur with antagonist or as needed +HGH throughout stim  NOTE: Recommend monitoring LH levels at EACH visit.  Start antagonist when E2>300 or follicle >12 mm. Close monitoring at end of stim (daily or every other day- do not let her monitor longer than every other day).  -Plan ICSI with partner FDA FRESH sperm and use ZYMOT  -Freeze all with PGT-A  NOTE: Intent for this cycle is for patient to freeze for her own use.  However, may still decide to make embryos gestational care eligible for future use (if so will need repeat Viromeds for both patient and partner)  Day of stim: 6  Patient Hx: 40 yo patient of Dr. Root, AMH-0.84    Here for US and/or lab monitoring; relevant findings reviewed.    Patient did not stay for discussion after monitoring,  Team will contact patient later today with results and plan.    Inocencia Walters  04/15/2025  7:19 AM    Patient to continue all medications at current doses and return to the office on Thursday for follicle scan, E2 and LH.   [employer] message sent with plan.       Inocencia Walters 04/15/25 1:34 PM

## 2025-04-17 ENCOUNTER — ANCILLARY PROCEDURE (OUTPATIENT)
Dept: ENDOCRINOLOGY | Facility: CLINIC | Age: 40
End: 2025-04-17

## 2025-04-17 ENCOUNTER — LAB REQUISITION (OUTPATIENT)
Dept: LAB | Facility: HOSPITAL | Age: 40
End: 2025-04-17
Payer: COMMERCIAL

## 2025-04-17 DIAGNOSIS — N97.9 FEMALE INFERTILITY: ICD-10-CM

## 2025-04-17 DIAGNOSIS — N97.9 FEMALE INFERTILITY, UNSPECIFIED: ICD-10-CM

## 2025-04-17 LAB
ESTRADIOL SERPL-MCNC: 961 PG/ML
LH SERPL-ACNC: 1.7 IU/L

## 2025-04-17 PROCEDURE — 83002 ASSAY OF GONADOTROPIN (LH): CPT

## 2025-04-17 PROCEDURE — RXMED WILLOW AMBULATORY MEDICATION CHARGE

## 2025-04-17 PROCEDURE — 82670 ASSAY OF TOTAL ESTRADIOL: CPT

## 2025-04-17 PROCEDURE — 76857 US EXAM PELVIC LIMITED: CPT

## 2025-04-17 NOTE — PROGRESS NOTES
CYCLING NOTE  Cycle #: 4  Reason For Treatment: ANNI, indication for GC  Protocol: Clomid mini stim, Clomid 100 mg CD3-7 with  overlap last 2 days, Add menopur with antagonist or as needed +HGH throughout stim  Day of stim: 8  Patient Hx: 38 yo patient of Dr. Root, AMH-0.84    NOTE: Recommend monitoring LH levels at EACH visit.  Start antagonist when E2>300 or follicle >12 mm. Close monitoring at end of stim (daily or every other day- do not let her monitor longer than every other day).  -Plan ICSI with partner FDA FRESH sperm and use ZYMOT  -Freeze all with PGT-A  NOTE: Intent for this cycle is for patient to freeze for her own use.  However, may still decide to make embryos gestational care eligible for future use (if so will need repeat Viromeds for both patient and partner)    Here for US and/or lab monitoring; relevant findings reviewed.    Patient did not stay for discussion after monitoring,  Team will contact patient later today with results and plan.    Inocencia Walters  04/17/2025  8:55 AM    Called patient with plan.   Patient to continue all medications at current doses and return to office on Saturday for follicle scan and lab work.   [email or web address] message sent with plan.   Patient's partner to schedule viromeds for tomorrow as it has to be completed within 7 days of retrieval.       Inocencia Walters 04/17/25 1:46 PM

## 2025-04-18 ENCOUNTER — PHARMACY VISIT (OUTPATIENT)
Dept: PHARMACY | Facility: CLINIC | Age: 40
End: 2025-04-18
Payer: COMMERCIAL

## 2025-04-18 ENCOUNTER — SPECIALTY PHARMACY (OUTPATIENT)
Dept: PHARMACY | Facility: CLINIC | Age: 40
End: 2025-04-18

## 2025-04-19 ENCOUNTER — LAB REQUISITION (OUTPATIENT)
Dept: LAB | Facility: HOSPITAL | Age: 40
End: 2025-04-19
Payer: COMMERCIAL

## 2025-04-19 ENCOUNTER — PATIENT MESSAGE (OUTPATIENT)
Dept: ENDOCRINOLOGY | Facility: CLINIC | Age: 40
End: 2025-04-19
Payer: COMMERCIAL

## 2025-04-19 ENCOUNTER — ANCILLARY PROCEDURE (OUTPATIENT)
Dept: ENDOCRINOLOGY | Facility: CLINIC | Age: 40
End: 2025-04-19

## 2025-04-19 DIAGNOSIS — N97.9 FEMALE INFERTILITY: ICD-10-CM

## 2025-04-19 DIAGNOSIS — N97.9 FEMALE INFERTILITY, UNSPECIFIED: ICD-10-CM

## 2025-04-19 LAB
ESTRADIOL SERPL-MCNC: 1528 PG/ML
LH SERPL-ACNC: 2.9 IU/L
PROGEST SERPL-MCNC: 1.2 NG/ML

## 2025-04-19 PROCEDURE — 84144 ASSAY OF PROGESTERONE: CPT

## 2025-04-19 PROCEDURE — 76857 US EXAM PELVIC LIMITED: CPT

## 2025-04-19 PROCEDURE — 82670 ASSAY OF TOTAL ESTRADIOL: CPT

## 2025-04-19 PROCEDURE — 83002 ASSAY OF GONADOTROPIN (LH): CPT

## 2025-04-19 NOTE — PROGRESS NOTES
CYCLING NOTE    Here for US and/or lab monitoring; relevant findings reviewed.    Cycle #: 4  Reason For Treatment: ANNI, indication for GC   Protocol: Clomid mini stim, Clomid 100 mg CD3-7 with  overlap last 2 days, Add menopur with antagonist or as needed +HGH throughout stim   Day of stim: 10 (started Clomid on 4/7)  Patient Hx: 39yr old patient of Dr. Root - AMH 0.84  PGT: yes PGT-A  Notes:     NOTE: Recommend monitoring LH levels at EACH visit.  Start antagonist when E2>300 or follicle >12 mm. Close monitoring at end of stim (daily or every other day- do not let her monitor longer than every other day).  -Plan ICSI with partner FDA FRESH sperm and use ZYMOT  -Freeze all with PGT-A  NOTE: Intent for this cycle is for patient to freeze for her own use.  However, may still decide to make embryos gestational care eligible for future use (if so will need repeat Viromeds for both patient and partner)    Partner had viromeds drawn on 4/18  Patient had viromeds drawn on 4/7    Patient did not stay for discussion after monitoring,  Team will contact patient later today with results and plan.    Natacha Chi  04/19/2025  10:30 AM    ===  Attempted to call patient to review plan. No answer. Left  and will send MC message.     Angie Her 04/19/25 12:18 PM

## 2025-04-20 ENCOUNTER — ANCILLARY PROCEDURE (OUTPATIENT)
Dept: ENDOCRINOLOGY | Facility: CLINIC | Age: 40
End: 2025-04-20

## 2025-04-20 ENCOUNTER — LAB REQUISITION (OUTPATIENT)
Dept: LAB | Facility: HOSPITAL | Age: 40
End: 2025-04-20
Payer: COMMERCIAL

## 2025-04-20 DIAGNOSIS — N97.9 FEMALE INFERTILITY, UNSPECIFIED: ICD-10-CM

## 2025-04-20 DIAGNOSIS — N97.9 FEMALE INFERTILITY: ICD-10-CM

## 2025-04-20 LAB
ESTRADIOL SERPL-MCNC: 1920 PG/ML
LH SERPL-ACNC: 10.9 IU/L
PROGEST SERPL-MCNC: 2.3 NG/ML

## 2025-04-20 PROCEDURE — 76857 US EXAM PELVIC LIMITED: CPT

## 2025-04-20 PROCEDURE — 83002 ASSAY OF GONADOTROPIN (LH): CPT

## 2025-04-20 PROCEDURE — 84144 ASSAY OF PROGESTERONE: CPT

## 2025-04-20 PROCEDURE — 82670 ASSAY OF TOTAL ESTRADIOL: CPT

## 2025-04-20 NOTE — PROGRESS NOTES
CYCLING NOTE    Here for US and/or lab monitoring; relevant findings reviewed.    Cycle #: 4  Reason For Treatment: ANNI, indication for GC   Protocol: Clomid mini stim, Clomid 100 mg CD3-7 with  overlap last 2 days, Add menopur with antagonist or as needed +HGH throughout stim   Day of stim: 11 (started Clomid on 4/7)  Patient Hx: 39yr old patient of Dr. Root - AMH 0.84  PGT: yes PGT-A  Notes:    NOTE: Recommend monitoring LH levels at EACH visit.  Start antagonist when E2>300 or follicle >12 mm. Close monitoring at end of stim (daily or every other day- do not let her monitor longer than every other day).  -Plan ICSI with partner FDA FRESH sperm and use ZYMOT  -Freeze all with PGT-A  NOTE: Intent for this cycle is for patient to freeze for her own use.  However, may still decide to make embryos gestational care eligible for future use (if so will need repeat Viromeds for both patient and partner)     Partner had viromeds drawn on 4/18  Patient had viromeds drawn on 4/7    Patient stayed for nurse visit. Pain is 0/10. Trigger shot and retrieval day instructions discussed with patient and partner.   Team will contact patient later today with results and plan.    Natacha Chi  04/20/2025  9:02 AM    [employer] sent to patient with the following:    Preet Vidal -- you are ready to trigger! Dr. Her will be calling you in a little bit to officially go over this on the phone but I wanted to send in a MyChart so you have it in writing. The Ganirelix you took this morning will be the last of your daily meds, and now the only medication you will take until time of retrieval is your trigger tonight!  Please trigger with HCG (Pregnyl) tonight at exactly 0830 PM. Dr. Finney would like to just use the HCG trigger for you.  Tomorrow please take a home pregnancy test tomorrow to assess effectiveness of your trigger.   Please see trigger shot and retrieval day instructions below!  Your retrieval will fall on Tuesday  4/22 at 0830 AM but please arrive 1 hr early, getting here at 0730 AM! We would also like to do a follicle scan before your egg retrieval.    We would like you to return for a visit on Tuesday @ 0730:   EGG RETRIEVAL    04/20/25 at 11:28 AM - Natacha Chi RN    ===  Called patient and discussed plan as above. Reviewed that given LH is already rising (10.9 today), there is a risk that she will prematurely ovulate these follicles prior to retrieval. Discussed that triggering tonight was still the best course of action at this point. Discussed that we will plan to scan patient prior to anesthesia induction to ensure she hasn't already ovulated.   Patient amenable to plan. All questions answered.     Angie Her 04/20/25 12:46 PM

## 2025-04-21 ENCOUNTER — PREP FOR PROCEDURE (OUTPATIENT)
Dept: ENDOCRINOLOGY | Facility: CLINIC | Age: 40
End: 2025-04-21

## 2025-04-21 RX ORDER — KETOROLAC TROMETHAMINE 30 MG/ML
30 INJECTION, SOLUTION INTRAMUSCULAR; INTRAVENOUS ONCE
OUTPATIENT
Start: 2025-04-21 | End: 2025-04-21

## 2025-04-21 RX ORDER — ACETAMINOPHEN 325 MG/1
650 TABLET ORAL ONCE AS NEEDED
OUTPATIENT
Start: 2025-04-21

## 2025-04-21 RX ORDER — ONDANSETRON HYDROCHLORIDE 2 MG/ML
4 INJECTION, SOLUTION INTRAVENOUS AS NEEDED
OUTPATIENT
Start: 2025-04-21

## 2025-04-21 RX ORDER — OXYCODONE AND ACETAMINOPHEN 5; 325 MG/1; MG/1
1 TABLET ORAL EVERY 6 HOURS PRN
Refills: 0 | OUTPATIENT
Start: 2025-04-21

## 2025-04-21 RX ORDER — MORPHINE SULFATE 2 MG/ML
2 INJECTION, SOLUTION INTRAMUSCULAR; INTRAVENOUS AS NEEDED
Refills: 0 | OUTPATIENT
Start: 2025-04-21

## 2025-04-21 RX ORDER — HYDROCODONE BITARTRATE AND ACETAMINOPHEN 5; 325 MG/1; MG/1
1 TABLET ORAL ONCE AS NEEDED
Refills: 0 | OUTPATIENT
Start: 2025-04-21

## 2025-04-21 RX ORDER — KETOROLAC TROMETHAMINE 30 MG/ML
30 INJECTION, SOLUTION INTRAMUSCULAR; INTRAVENOUS ONCE AS NEEDED
OUTPATIENT
Start: 2025-04-21 | End: 2025-04-26

## 2025-04-21 RX ORDER — HYDROMORPHONE HYDROCHLORIDE 0.2 MG/ML
0.2 INJECTION INTRAMUSCULAR; INTRAVENOUS; SUBCUTANEOUS
OUTPATIENT
Start: 2025-04-21

## 2025-04-21 NOTE — PROGRESS NOTES
Patient triggered yesterday for retrieval 4/22/25.  To be scanned prior to retrieval.    Dominic Yanez 04/21/25 12:53 PM

## 2025-04-22 ENCOUNTER — ANCILLARY PROCEDURE (OUTPATIENT)
Dept: ENDOCRINOLOGY | Facility: CLINIC | Age: 40
End: 2025-04-22

## 2025-04-22 ENCOUNTER — APPOINTMENT (OUTPATIENT)
Dept: ENDOCRINOLOGY | Facility: CLINIC | Age: 40
End: 2025-04-22

## 2025-04-22 ENCOUNTER — HOSPITAL ENCOUNTER (OUTPATIENT)
Dept: ENDOCRINOLOGY | Facility: CLINIC | Age: 40
Discharge: HOME | End: 2025-04-22

## 2025-04-22 ENCOUNTER — PATIENT MESSAGE (OUTPATIENT)
Dept: ENDOCRINOLOGY | Facility: CLINIC | Age: 40
End: 2025-04-22
Payer: COMMERCIAL

## 2025-04-22 DIAGNOSIS — N97.9 FEMALE INFERTILITY: ICD-10-CM

## 2025-04-22 DIAGNOSIS — Z31.83 ENCOUNTER FOR ASSISTED REPRODUCTIVE FERTILITY CYCLE: ICD-10-CM

## 2025-04-22 PROCEDURE — 76857 US EXAM PELVIC LIMITED: CPT | Performed by: OBSTETRICS & GYNECOLOGY

## 2025-04-22 PROCEDURE — 76857 US EXAM PELVIC LIMITED: CPT

## 2025-04-22 NOTE — PROGRESS NOTES
Patient was scanned this morning prior to retrieval due to bilateral pelvic discomfort. She has a history of premature ovulation.    Patient had a premature ovulation on the ultrasound with free pelvic fluid and no large follicles detected on the bilateral ovaries.    Patient was offered to proceed with IUI, but decided to have timed intercourse instead.    They will supplement with progesterone suppositories.    Will call with pergnancy test.    If negative may want to proceed with another retrieval prior to lupron suppresion prior to transfer      IVF Procedure Area H&P    Ms. Marcy Putnam is a 39 y.o. F who presents for egg retrieval under anesthesia.   Patient to be scanned prior to the anesthesia induction.  Interval history reviewed and affirmed as up to date.      MedHx: Medical History[1]    SurgHx: Surgical History[2]    Meds: Medications Ordered Prior to Encounter[3]    Allergies: RX Allergies[4]    ROS: negative apart from what is indicated above    PE:   Gen: AA x 3   Resp: No labored breathing  Abdomen: soft, non-tender, non-distended    A/P: Ms. Marcy Putnam is a 39 y.o. F who presents for above procedure under anesthesia in the IVF procedure area. Okay to proceed with above stated procedure.    Francine Gutierrez          [1]   Past Medical History:  Diagnosis Date    Anxiety disorder, unspecified     Anxiety    Infertility, female     Personal history of other diseases of the female genital tract     History of abnormal cervical Papanicolaou smear    Personal history of other specified conditions     History of headache    Raynaud's syndrome without gangrene     Raynauds disease    Renal dysplasia     Cystic dysplasia of one kidney   [2]   Past Surgical History:  Procedure Laterality Date    CERVICAL BIOPSY  W/ LOOP ELECTRODE EXCISION  11/09/2016    Cervical Loop Electrosurgical Excision (LEEP)    COLPOSCOPY  11/09/2016    Colposcopy    KIDNEY SURGERY  11/07/2016    Kidney Surgery    OTHER  SURGICAL HISTORY  11/07/2016    Nephrectomy    OTHER SURGICAL HISTORY  11/09/2016    Pyeloplasty   [3]   Current Outpatient Medications on File Prior to Visit   Medication Sig Dispense Refill    cholecalciferol (Vitamin D-3) 50 MCG (2000 UT) tablet Take 1 tablet (50 mcg) by mouth once daily.      chorionic gonadotropin (Pregnyl) 10,000 unit injection Reconstitute according to instructions and inject 10,000 units (1 mL) under the skin as a one time dose, as directed per provider for trigger. 1 each 0    clomiPHENE (Clomid) 50 mg tablet Take 2 tablets (100 mg) by mouth once daily. Take 2 tablets by mouth cycle days 3-7 as directed 10 tablet 0    co-enzyme Q-10 30 mg capsule Take 1 capsule (30 mg) by mouth once daily.      follitropin beta (Follistim AQ) 600 unit/0.72 mL injection Inject 150 Units under the skin once daily in the evening. Inject under the skin as directed by provider 3 each 2    ganirelix (Orgalutran) 250 mcg/0.5 mL syringe injection Inject 250 mcg (1 syringe) under the skin once daily as directed per provider. 5 each 2    leuprolide (Lupron) 1 mg/0.2 mL injection Using an insulin syringe, draw up 80 units and inject under the skin as a one time dose, as directed per provider for trigger. 1 kit 0    levothyroxine (Synthroid, Levoxyl) 75 mcg tablet Take 1 tablet (75 mcg) by mouth early in the morning.. Take on an empty stomach first thing in the morning, nothing to eat or drink besides water for 1 hour, as well as other medications. Take prenatals in the evening. 30 tablet 2    menotropins (Menopur) 75 unit recon soln injection Reconstitute and inject 75 Units under the skin once daily in the evening. Inject under the skin as directed by provider 8 each 2    pen injector, for follitropin, pen injector Inject 1 Pen under the skin once daily. Use as directed to administer Follistrim 1 each 0    PNV no.153/FA/om3/dha/epa/fish (PRENATAL GUMMIES ORAL) Take 4 tablets by mouth once daily.      somatropin  (Omnitrope) Draw up 1.14 mL of Bacteriostatic Water and inject into Omnitrope vial. Using an insulin syringe, draw up 25 units and inject under the skin once daily as directed per provider. 2 each 2    valACYclovir (Valtrex) 500 mg tablet Take 1 tablet (500 mg) by mouth once daily.       No current facility-administered medications on file prior to visit.   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 All cephlasporins    Penicillins Hives and Rash

## 2025-04-23 ENCOUNTER — TELEPHONE (OUTPATIENT)
Dept: ENDOCRINOLOGY | Facility: CLINIC | Age: 40
End: 2025-04-23
Payer: COMMERCIAL

## 2025-04-23 DIAGNOSIS — N97.9 FEMALE INFERTILITY: ICD-10-CM

## 2025-04-23 RX ORDER — PROGESTERONE 100 MG/1
200 CAPSULE ORAL 2 TIMES DAILY
Qty: 60 CAPSULE | Refills: 0 | Status: SHIPPED | OUTPATIENT
Start: 2025-04-23 | End: 2026-04-23

## 2025-04-23 RX ORDER — ESTRADIOL 2 MG/1
2 TABLET ORAL 2 TIMES DAILY
Qty: 60 TABLET | Refills: 0 | Status: SHIPPED | OUTPATIENT
Start: 2025-04-23 | End: 2026-04-23

## 2025-04-23 NOTE — TELEPHONE ENCOUNTER
----- Message from Poornima Root sent at 4/23/2025  2:04 PM EDT -----  Regarding: Plan  Can you let her know I will recommend continuing her prometrium 200 mg BID for 10 days- start todayStart estrace 2 mg BID 1 week after triggerBaseline with menses (after stopping progesterone, continue estrace) for repeat Clomid mini stimLet her know I think she overall had a good response and will recommend trigger with 17 mm follicle-Will also continue plan of checking LH at each visit and frequent monitoringOther alternative would be Long Lupron but she would have to start in luteal phase before she knows if she's pregnant and not sure how she would respond to that protocolPoornima Root 04/23/25 2:07 PM

## 2025-05-01 ENCOUNTER — SPECIALTY PHARMACY (OUTPATIENT)
Dept: PHARMACY | Facility: CLINIC | Age: 40
End: 2025-05-01

## 2025-05-01 DIAGNOSIS — N97.9 FEMALE INFERTILITY: ICD-10-CM

## 2025-05-01 RX ORDER — CLOMIPHENE CITRATE 50 MG/1
100 TABLET ORAL DAILY
Qty: 10 TABLET | Refills: 0 | Status: SHIPPED | OUTPATIENT
Start: 2025-05-01 | End: 2026-05-01

## 2025-05-01 RX ORDER — CHORIONIC GONADOTROPIN 10000 UNIT
10000 KIT INTRAMUSCULAR ONCE AS NEEDED
Qty: 1 EACH | Refills: 0 | Status: SHIPPED | OUTPATIENT
Start: 2025-05-01

## 2025-05-01 NOTE — PROGRESS NOTES
Boarding Pass IVF/INJECTABLE TIC/IUI  *CREATING EMBRYOS FOR AUTOLOGOUS USE, INTENT IS TO CARRY HERSELF*    Age: 40 y.o.    Provider: Poornima Root MD  Primary RN: Shanae Araujo  Reasons for Treatment: Diminished Ovarian Reserve and Chronic Kidney disease   Last BMI  25 : 29.62 kg/m²       Medical History[1]    Date Done Consultation Results/Comments   3/18/25 Medication Protocol Fertility Plan Update:  Patient will plan another IVF cycle with same protocol:   Luteal estrace Clomid mini stim  Clomid 100 mg CD3-7 with  overlap last 2 days  Add menopur with antagonist or as needed  +HGH throughout stim  NOTE: Recommend monitoring LH levels at EACH visit.  Start antagonist when E2>300 or follicle >12 mm. Close monitoring at end of stim (daily or every other day- do not let her monitor longer than every other day).  -Plan ICSI with partner FDA FRESH sperm and use ZYMOT-   -Freeze all with PGT-A  -Recommend trigger with 17 mm follicle due to history of premature ovulation   NOTE: Intent for this cycle is for patient to freeze for her own use.  However, may still decide to make embryos gestational care eligible for future use (if so will need repeat Viromeds for both patient and partner)  **will NOT make these embryos GC eligible, patients plan is for autologous use    3/28/25 Authorization to Share [x]      5/10/24 IVF Consult  [x]    PGT-A/M? YES PGT-A 3/14/25   12/12/24 IVF Information and Authorization (to be completed annually) Received and in chart: Yes (Shanae Araujo RN)   3/20/25  Waiver (Out) Form Received and in chart: Yes (Shanae Araujo RN)   25 [employer] Packet [x]    25 Procedure Order Placed [x]       2024, 2024 MFM Consult Okay to proceed? Yes   25 Psych Consult Okay to proceed? Yes   N/A Genetics Consult Okay to proceed? N/A    Other    Date Done Female Labs Results/Comments   2025 T&S (Q 1 Year) ABO: A  Rh: POS  Antibody: NEG     25 Hep B sAg  NEGATIVE  (Viromeds scanned in)   4/7/25 Hep C AB NEGATIVE  (Viromeds scanned in)   4/7/25 HIV NEGATIVE  (Viromeds scanned in)   4/7/25 Syphilis NEGATIVE  (Viromeds scanned in)   4/7/25 GC/CT GC: NEGATIVE  (Viromeds scanned in)  CT: NEGATIVE  (Viromeds scanned in)   10/30/23 Rubella (Q 5 Years) IMMUNE   10/30/23 Varicella (Q 5 Years) IMMUNE   10/7/2024 TSH 2.53 (Ref range: 0.44 - 3.98 mIU/L)   4/2/2025 HgbA1C 5.4 (Ref range: <5.7 % of total Hgb)   4/23/24 AMH 0.84   2022 Carrier Screening Myriad 2bP: Completed previously at [facility]  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   Two echogenic areas superior to the fundal aspect of the endometrium.   Left hemorrhagic cyst.   There is no free fluid visualized in the peritoneal cavity.   (Has had many follicle scans since)    HSG: FL HYSTEROSALPINGOGRAM (10/22/2024):   IMPRESSION:  1. Correlate with real time fluoroscopic findings at the time of  procedure.  2. T shaped configuration of the cavity, otherwise normal study.    SIS:     Hyster: (12/12/2024) Procedure: Diagnostic Hysteroscopy   Preop diagnosis: IVF  Post op diagnosis: Same   Assistant: RAJNI Malloy    Anesthesia: None   IV: None   EBL: 3 cc  Specimens: None   Complications: None   Risks benefits and alternatives of the procedure explained to the patient and informed consent was obtained. Urine pregnancy test was performed and was negative. Time out was performed. The patient was placed in the dorsal lithotomy position and a sterile speculum was placed in the vagina. The cervix was sterilized with Betadine x3. Paracervical block with lidocaine 1% was administered.   Tenaculum: No  Dilation: No  The hysteroscope was placed in the cervix and advanced into the uterine cavity. Normal saline was used for distension media. Images were obtained and findings noted as below.    All instruments were then removed. Good hemostasis was noted. Patient tolerated the procedure well returned to the recovery area in stable condition. .   Findings:   Cavity: Smooth cavity no lesions noted; cavity was noted to be narrow; endometrium healthy in appearance  Ostia: Bilateral tubal ostia visualized  Additional Notes: Patient with difficulty tolerating procedure from a comfort standpoint, but was able to complete the procedure    Jia Codyyne 12/12/24 3:03 PM              4/24/24 Pap Smear Negative for intraepithelial lesion or malignancy.   HPV Negative   N/A Mammogram ( > 40) N/A    Date Done Male Labs   Results/Comments  ROBERT THOMPSON (MRN: [id number])   12/6/2024 Hep B sAg Nonreactive   12/6/2024 Hep C AB  Nonreactive   12/6/2024 HIV Nonreactive   12/6/2024 Syphilis Nonreactive   12/6/2024 GC/CT GC: Negative  CT: Negative   2022 Carrier Screening Myriad- completed at [facility]  Pos Biotinidase Deficiency   2/21/2025 Semen Analysis  Volume(mL): 1.8  Concentration(million/mL): 25  Motility(%): 40  Motile Count(million): 6.043   12/31/2024 Sperm Freeze  # of vials: 5  TMS post thaw: 8.54    **repeating freeze to use as back up   Date Done Miscellaneous Results/Comments    BMI Checklist  BMI > 40 or < 18 Added to chart:   No    >= 45 Checklist  Added to chart:   No   **Does not need to be completed prior to placing on IVF calendar**    MD Completion:  PAT needed: No  Ectopic Risk: No  Medically Complex: Yes -cystic renal dysplasia    Patient is cleared to proceed, wants to make these embryos for Autologous use  Partner plans to freeze again.  Plan to use FRESH sperm ideally with NEW freeze sample as backup  Poornima Root 05/08/25 3:54 PM           [1]   Past Medical History:  Diagnosis Date    Anxiety disorder, unspecified     Anxiety    Infertility, female     Personal history of other diseases of the female genital tract     History of abnormal cervical Papanicolaou smear    Personal history of  other specified conditions     History of headache    Raynaud's syndrome without gangrene     Raynauds disease    Renal dysplasia     Cystic dysplasia of one kidney

## 2025-05-02 PROCEDURE — RXMED WILLOW AMBULATORY MEDICATION CHARGE

## 2025-05-06 NOTE — PROGRESS NOTES
Patient just went through IVF cycle #4 and was cancelled due to premature ovulation, triggered on 4/20. Was scanned prior to egg retrieval and the retrieval was cancelled. Patient was instructed to start prometrium on 4/23 and take for 10 days (last one 5/2) and start estrace 2mg BID one week after trigger (4/27). Pt states she has negative pregnancy tests and no menses yet.  Will confirm this is appropriate due to patient taking 10 days of prometrium.  Shanae Araujo 05/06/25 11:44 AM    Patient just notified this RN that she did not realize she was only supposed to take for 10 days and took her last prometrium 5/5 PM. Pt is still taking estrace. Told patient to continue estrace and should get a period this weekend if she took her last Prometrium 5/5 and that is why no menses has occurred yet. Will continue estrace until baseline and call with menses to schedule baseline for Clomid Mini Stim for IVF #5.  Shanae Araujo 05/06/25 12:40 PM

## 2025-05-12 ENCOUNTER — TELEPHONE (OUTPATIENT)
Dept: ENDOCRINOLOGY | Facility: CLINIC | Age: 40
End: 2025-05-12
Payer: COMMERCIAL

## 2025-05-12 DIAGNOSIS — Z01.818 PRE-PROCEDURAL EXAMINATION: ICD-10-CM

## 2025-05-12 DIAGNOSIS — N97.9 FEMALE INFERTILITY: ICD-10-CM

## 2025-05-12 NOTE — TELEPHONE ENCOUNTER
Called pt back, LMP today 5/12, will schedule baseline for tomorrow, per Dr. Root take estrace today but can hold dose in the morning tomorrow.  Partner scheduled for second freeze this week to use as backup.  Shanae Araujo 05/12/25 2:07 PM

## 2025-05-13 ENCOUNTER — PHARMACY VISIT (OUTPATIENT)
Dept: PHARMACY | Facility: CLINIC | Age: 40
End: 2025-05-13
Payer: COMMERCIAL

## 2025-05-13 ENCOUNTER — ANCILLARY PROCEDURE (OUTPATIENT)
Dept: ENDOCRINOLOGY | Facility: CLINIC | Age: 40
End: 2025-05-13

## 2025-05-13 ENCOUNTER — SPECIALTY PHARMACY (OUTPATIENT)
Dept: PHARMACY | Facility: CLINIC | Age: 40
End: 2025-05-13

## 2025-05-13 ENCOUNTER — LAB REQUISITION (OUTPATIENT)
Dept: LAB | Facility: HOSPITAL | Age: 40
End: 2025-05-13
Payer: COMMERCIAL

## 2025-05-13 DIAGNOSIS — Z01.818 ENCOUNTER FOR OTHER PREPROCEDURAL EXAMINATION: ICD-10-CM

## 2025-05-13 DIAGNOSIS — N97.9 FEMALE INFERTILITY, UNSPECIFIED: ICD-10-CM

## 2025-05-13 DIAGNOSIS — N97.9 FEMALE INFERTILITY: ICD-10-CM

## 2025-05-13 LAB
ERYTHROCYTE [DISTWIDTH] IN BLOOD BY AUTOMATED COUNT: 12.1 % (ref 11.5–14.5)
ESTRADIOL SERPL-MCNC: 416 PG/ML
HCT VFR BLD AUTO: 41.2 % (ref 36–46)
HGB BLD-MCNC: 13.5 G/DL (ref 12–16)
MCH RBC QN AUTO: 28.4 PG (ref 26–34)
MCHC RBC AUTO-ENTMCNC: 32.8 G/DL (ref 32–36)
MCV RBC AUTO: 87 FL (ref 80–100)
NRBC BLD-RTO: 0 /100 WBCS (ref 0–0)
PLATELET # BLD AUTO: 313 X10*3/UL (ref 150–450)
PROGEST SERPL-MCNC: 0.5 NG/ML
RBC # BLD AUTO: 4.76 X10*6/UL (ref 4–5.2)
WBC # BLD AUTO: 11.5 X10*3/UL (ref 4.4–11.3)

## 2025-05-13 PROCEDURE — 82670 ASSAY OF TOTAL ESTRADIOL: CPT

## 2025-05-13 PROCEDURE — 76857 US EXAM PELVIC LIMITED: CPT | Performed by: OBSTETRICS & GYNECOLOGY

## 2025-05-13 PROCEDURE — 76857 US EXAM PELVIC LIMITED: CPT

## 2025-05-13 PROCEDURE — 84144 ASSAY OF PROGESTERONE: CPT

## 2025-05-13 PROCEDURE — 85027 COMPLETE CBC AUTOMATED: CPT

## 2025-05-13 NOTE — PROGRESS NOTES
DEANN NOTE - IVF STIM BASELINE  Patient presents for baseline ultrasound and/or labs.    Treatment protocol: Clomid mini stim, Clomid 100 mg CD3-7 with  overlap last 2 days, Add menopur with antagonist or as needed, +HGH throughout stim  NOTE: Recommend monitoring LH levels at EACH visit.  Start antagonist when E2>300 or follicle >12 mm. Close monitoring at end of stim (daily or every other day- do not let her monitor longer than every other day).  -Recommend trigger with 17 mm follicle due to history of premature ovulation   -Freeze all with PGT-A  **EMBRYOS BEING CREATED FOR AUTOLOGOUS USE, INTENT IS FOR HER TO CARRY   Lead in: Estrace  Start date for lead in: 4/27  Last estrace/OCP date: 5/12 PM  PGT-A/M? PGT-A (Rob)  PGT order scanned into Epic, confirmed by: STARLA on 3/14/25  Plan to freeze: embryos    [employer] forms/out waiver complete:  Yes    Does patient have medications onhand?  Yes  Pharmacy: [facility]    Boarding pass signed off:  Yes    CBC reviewed by MD?  Yes    Date of Female STDs: 4/7/25    Date of Male STDs: 12/6/24    Medically complex on boarding pass:   yes cystic renal dysplasia     If indicated, is PAT consult complete?  N/A    SPERM:  partner  Fresh with Frozen Backup  Number of vials confirmed:     No results found for this or any previous visit (from the past 96 hours).    Additional details: PARTNER FREEZING ADDITIONAL SPERM THIS WEEK TO USE FOR BACK UP, PLAN FRESH WITH ZYMOT AND USE NEW SAMPLE FOR BACK UP  Shanae Araujo  05/13/2025  8:02 AM      ====  Will plan to trigger today given 17mm follicle. Will do p4 in 1 week to ensure ovulatory progesterone and start luteal estrace lead in and baseline with next period.   Can have TIC with this trigger.     Angie Her 05/13/25 1:01 PM      Called patient with plan, arie was also sent, pt to trigger tonight with HCG at anytime, will check P4 level in one week to confirm ovulation and if ovulatory start estrace and rebaseline with  menses. Pt to get trigger from [facility], will go to lab for P4 level, will add to huddle.  Shanae Araujo 05/13/25 3:58 PM

## 2025-05-20 ENCOUNTER — LAB REQUISITION (OUTPATIENT)
Dept: LAB | Facility: HOSPITAL | Age: 40
End: 2025-05-20

## 2025-05-20 ENCOUNTER — APPOINTMENT (OUTPATIENT)
Dept: INTEGRATIVE MEDICINE | Facility: CLINIC | Age: 40
End: 2025-05-20
Payer: COMMERCIAL

## 2025-05-20 DIAGNOSIS — N97.9 FEMALE INFERTILITY, UNSPECIFIED: ICD-10-CM

## 2025-05-20 DIAGNOSIS — N97.9 FEMALE INFERTILITY: ICD-10-CM

## 2025-05-20 LAB — PROGEST SERPL-MCNC: 0.3 NG/ML

## 2025-05-20 PROCEDURE — 84144 ASSAY OF PROGESTERONE: CPT

## 2025-05-20 PROCEDURE — 36415 COLL VENOUS BLD VENIPUNCTURE: CPT

## 2025-05-20 NOTE — PROGRESS NOTES
Patient going to outpatient lab for CD 21 progesterone level following HCG trigger due to follicle at IVF baseline on  5/13. If P4 ovulatory can start luteal estrace and re-baseline with menses.     Will contact patient with results and plan.        [facility] PROVIDER NOTE- PROGESTERONE CHECK  Ultrasound and/or labs reviewed at [facility].   Results for orders placed or performed in visit on 05/20/25 (from the past 96 hours)   Progesterone   Result Value Ref Range    Progesterone 0.3 ng/mL       Ovulatory progesterone  no    Next steps: Come in for follicle scan, e2, lh tomorrow  My chart sent to patient.   Shanae Finney  05/20/2025  4:00 PM      Called patient plan.   Patient will call tomorrow morning to schedule follicle scan, E2 and LH.   Message sent to .       Inocencia Walters 05/20/25 4:11 PM

## 2025-05-21 ENCOUNTER — LAB REQUISITION (OUTPATIENT)
Dept: LAB | Facility: HOSPITAL | Age: 40
End: 2025-05-21

## 2025-05-21 ENCOUNTER — ANCILLARY PROCEDURE (OUTPATIENT)
Dept: ENDOCRINOLOGY | Facility: CLINIC | Age: 40
End: 2025-05-21
Payer: COMMERCIAL

## 2025-05-21 DIAGNOSIS — N97.9 FEMALE INFERTILITY, UNSPECIFIED: ICD-10-CM

## 2025-05-21 DIAGNOSIS — N97.9 FEMALE INFERTILITY: ICD-10-CM

## 2025-05-21 LAB
ESTRADIOL SERPL-MCNC: 94 PG/ML
LH SERPL-ACNC: 4.9 IU/L
PROGEST SERPL-MCNC: 0.6 NG/ML

## 2025-05-21 PROCEDURE — 84144 ASSAY OF PROGESTERONE: CPT

## 2025-05-21 PROCEDURE — 83002 ASSAY OF GONADOTROPIN (LH): CPT

## 2025-05-21 PROCEDURE — 76857 US EXAM PELVIC LIMITED: CPT | Performed by: OBSTETRICS & GYNECOLOGY

## 2025-05-21 PROCEDURE — 82670 ASSAY OF TOTAL ESTRADIOL: CPT

## 2025-05-21 PROCEDURE — RXMED WILLOW AMBULATORY MEDICATION CHARGE

## 2025-05-21 PROCEDURE — 76857 US EXAM PELVIC LIMITED: CPT

## 2025-05-21 NOTE — PROGRESS NOTES
40 year old patient of Dr. Root presents for follicle scan, E2 and LH.     Patient triggered with HCG after a follicle was found at 5/13 baseline. P4 one week later (5/20) was not ovulatory; plan was to start luteal estrace lead in if ovulatory. Per Dr. Finney, patient to have a scan today to assess cycle and see if follicle vs cyst is still present. Patient is trying to proceed with IVF #5       05/21/25 at 9:43 AM - Nacho Marinelli RN      Telephone call made to patient with MD plan. Given that scan and lab work are appropriate for baseline, providers are okay to start patient for IVF #5. Patient to begin Clomid 100 mg tonight for 5 days and begin FSH and HGH on 5/24 with 2 days of overlap. Patient to RTC on 5/27 for continued monitoring. Patient agreeable. Message sent to front for scheduling. Detailed MyChart sent as well.       05/21/25 at 4:30 PM - Nacho Marinelli RN

## 2025-05-22 ENCOUNTER — PHARMACY VISIT (OUTPATIENT)
Dept: PHARMACY | Facility: CLINIC | Age: 40
End: 2025-05-22
Payer: COMMERCIAL

## 2025-05-22 ENCOUNTER — SPECIALTY PHARMACY (OUTPATIENT)
Dept: PHARMACY | Facility: CLINIC | Age: 40
End: 2025-05-22

## 2025-05-22 DIAGNOSIS — N97.9 FEMALE INFERTILITY: ICD-10-CM

## 2025-05-22 NOTE — PROGRESS NOTES
DEANN NOTE - IVF STIM BASELINE  Patient presents for baseline ultrasound and/or labs.    Treatment protocol: atient will plan another IVF cycle with same protocol:   Luteal estrace Clomid mini stim  Clomid 100 mg CD3-7 with  overlap last 2 days  Add menopur with antagonist or as needed  +HGH throughout stim  NOTE: Recommend monitoring LH levels at EACH visit.  Start antagonist when E2>300 or follicle >12 mm. Close monitoring at end of stim (daily or every other day- do not let her monitor longer than every other day).  -Plan ICSI with partner FDA FRESH sperm and use ZYMOT-   -Freeze all with PGT-A  -Recommend trigger with 17 mm follicle due to history of premature ovulation   NOTE: Intent for this cycle is for patient to freeze for her own use.  However, may still decide to make embryos gestational care eligible for future use (if so will need repeat Viromeds for both patient and partner)  **will NOT make these embryos GC eligible, patients plan is for autologous use   Lead in: none   PGT-A/M? Yes; Req Sent: Yes; PGT-M Test Ready: No  ; Company: [employer]  PGT order scanned into Epic, confirmed by:  on 5/22/2025  Plan to freeze: embryos    [employer] forms/out waiver complete:  Yes    Does patient have medications onhand?  Yes  Pharm[facility]    Boarding pass signed off:  Yes    CBC reviewed by MD?  Yes  Component      Latest Ref Rng 5/13/2025   WBC      4.4 - 11.3 x10*3/uL 11.5 (H)    nRBC      0.0 - 0.0 /100 WBCs 0.0    RBC      4.00 - 5.20 x10*6/uL 4.76    HEMOGLOBIN      12.0 - 16.0 g/dL 13.5    HEMATOCRIT      36.0 - 46.0 % 41.2    MCV      80 - 100 fL 87    MCH      26.0 - 34.0 pg 28.4    MCHC      32.0 - 36.0 g/dL 32.8    RED CELL DISTRIBUTION WIDTH      11.5 - 14.5 % 12.1    Platelets      150 - 450 x10*3/uL 313        Date of Female STDs: 04/7/2025    Date of Male STDs: 12/6/2024    Medically complex on boarding pass:   yes cystic renal dysplasia     If indicated, is PAT consult  complete?  No    SPERM:  partner  Fresh with Frozen Backup  Number of vials confirmed: 6 vials, 4 with viromeds and 2 without - WILL PLAN TO USE FRESH SPERM COLLECTION FOR THIS ROUND  Sperm prep order placed yes      Additional details:   Nacho Marinelli  05/22/2025  8:44 AM    Shanae Finney 05/22/25 2:03 PM       Confirmed with lab.      05/22/25 at 2:48 PM - Nacho Marinelli RN

## 2025-05-27 ENCOUNTER — PHARMACY VISIT (OUTPATIENT)
Dept: PHARMACY | Facility: CLINIC | Age: 40
End: 2025-05-27
Payer: COMMERCIAL

## 2025-05-27 ENCOUNTER — SPECIALTY PHARMACY (OUTPATIENT)
Dept: PHARMACY | Facility: CLINIC | Age: 40
End: 2025-05-27

## 2025-05-27 ENCOUNTER — ANCILLARY PROCEDURE (OUTPATIENT)
Dept: ENDOCRINOLOGY | Facility: CLINIC | Age: 40
End: 2025-05-27

## 2025-05-27 ENCOUNTER — LAB REQUISITION (OUTPATIENT)
Dept: LAB | Facility: HOSPITAL | Age: 40
End: 2025-05-27
Payer: COMMERCIAL

## 2025-05-27 DIAGNOSIS — N97.9 FEMALE INFERTILITY, UNSPECIFIED: ICD-10-CM

## 2025-05-27 DIAGNOSIS — N97.9 FEMALE INFERTILITY: ICD-10-CM

## 2025-05-27 LAB
ESTRADIOL SERPL-MCNC: 666 PG/ML
LH SERPL-ACNC: 6.1 IU/L

## 2025-05-27 PROCEDURE — 82670 ASSAY OF TOTAL ESTRADIOL: CPT

## 2025-05-27 PROCEDURE — 76857 US EXAM PELVIC LIMITED: CPT

## 2025-05-27 PROCEDURE — RXMED WILLOW AMBULATORY MEDICATION CHARGE

## 2025-05-27 PROCEDURE — 83002 ASSAY OF GONADOTROPIN (LH): CPT

## 2025-05-27 RX ORDER — CHORIONIC GONADOTROPIN 10000 UNIT
10000 KIT INTRAMUSCULAR ONCE AS NEEDED
Qty: 1 EACH | Refills: 0 | Status: SHIPPED | OUTPATIENT
Start: 2025-05-27 | End: 2025-05-29 | Stop reason: SDUPTHER

## 2025-05-27 NOTE — PROGRESS NOTES
Requested Prescriptions     Signed Prescriptions Disp Refills    follitropin beta (Follistim AQ) 300 unit/0.36 mL injection 3 each 2     Sig: Inject 150 Units under the skin once daily in the evening. Inject under the skin as directed by provider    chorionic gonadotropin (Pregnyl) 10,000 unit injection 1 each 0     Sig: Reconstitute according to instructions and inject 10,000 units (1 mL) under the skin as a one time dose, as directed per provider for trigger.       Sent in refills of FSH and HCG Trigger to get medication through [facility] per protocol. Patient requested 300 unit cartridge.       Ania Pérez (Katie), PharmD  [facility]  Clinical Pharmacy Specialist- Fertility   [facility], [place] [address]  Email: [email or web address]  Tel: [phone]       Fax: [phone]

## 2025-05-27 NOTE — PROGRESS NOTES
CYCLING NOTE    Treatment protocol: patient will plan another IVF cycle with same protocol:   Luteal estrace Clomid mini stim  Clomid 100 mg CD3-7 with  overlap last 2 days  Add menopur with antagonist or as needed  +HGH throughout stim  NOTE: Recommend monitoring LH levels at EACH visit.  Start antagonist when E2>300 or follicle >12 mm. Close monitoring at end of stim (daily or every other day- do not let her monitor longer than every other day).  -Plan ICSI with partner FRESH sperm and use ZYMOT  -Freeze all with PGT-A  -Recommend trigger with 17 mm follicle due to history of premature ovulation   NOTE: Intent for this cycle is for patient to freeze for her own use.  However, may still decide to make embryos gestational care eligible for future use (if so will need repeat Viromeds for both patient and partner)  **will NOT make these embryos GC eligible, patients plan is for autologous use     Here for US and/or lab monitoring; relevant findings reviewed.    Patient stayed for nurse visit. Pain is 0/10  Team will contact patient later today with results and plan.    Patient requesting message sent to team to see if she needs to start ganirelix this AM due to 14 and 15mm follicle, per Dr. Her YES pt can start ganirelix now.    Shanae Araujo  05/27/2025  8:26 AM    MC to patient with plan, will continue same doses, add HMG 75 and come back tomorrow for evaluation with E2 LH and P4 with scan. Dr. Ramirez is DOD tomorrow so can review what she thinks with this cycle, if we plan to trigger at 17mm patient may only have 2 follicles.  Shanae Araujo 05/27/25 1:39 PM

## 2025-05-28 ENCOUNTER — TELEPHONE (OUTPATIENT)
Dept: ENDOCRINOLOGY | Facility: CLINIC | Age: 40
End: 2025-05-28
Payer: COMMERCIAL

## 2025-05-28 ENCOUNTER — ANCILLARY PROCEDURE (OUTPATIENT)
Dept: ENDOCRINOLOGY | Facility: CLINIC | Age: 40
End: 2025-05-28

## 2025-05-28 ENCOUNTER — LAB REQUISITION (OUTPATIENT)
Dept: LAB | Facility: HOSPITAL | Age: 40
End: 2025-05-28
Payer: COMMERCIAL

## 2025-05-28 ENCOUNTER — SPECIALTY PHARMACY (OUTPATIENT)
Dept: PHARMACY | Facility: CLINIC | Age: 40
End: 2025-05-28

## 2025-05-28 ENCOUNTER — PHARMACY VISIT (OUTPATIENT)
Dept: PHARMACY | Facility: CLINIC | Age: 40
End: 2025-05-28
Payer: COMMERCIAL

## 2025-05-28 DIAGNOSIS — N97.9 FEMALE INFERTILITY: ICD-10-CM

## 2025-05-28 DIAGNOSIS — N97.9 FEMALE INFERTILITY, UNSPECIFIED: ICD-10-CM

## 2025-05-28 LAB
ESTRADIOL SERPL-MCNC: 890 PG/ML
LH SERPL-ACNC: 2.6 IU/L
PROGEST SERPL-MCNC: 1 NG/ML

## 2025-05-28 PROCEDURE — 82670 ASSAY OF TOTAL ESTRADIOL: CPT

## 2025-05-28 PROCEDURE — 83002 ASSAY OF GONADOTROPIN (LH): CPT

## 2025-05-28 PROCEDURE — 76857 US EXAM PELVIC LIMITED: CPT

## 2025-05-28 PROCEDURE — 84144 ASSAY OF PROGESTERONE: CPT

## 2025-05-28 PROCEDURE — RXMED WILLOW AMBULATORY MEDICATION CHARGE

## 2025-05-28 NOTE — PROGRESS NOTES
CYCLING NOTE     Treatment protocol: patient will plan another IVF cycle with same protocol:   Luteal estrace Clomid mini stim  Clomid 100 mg CD3-7 with  overlap last 2 days  Add menopur with antagonist or as needed  +HGH throughout stim  NOTE: Recommend monitoring LH levels at EACH visit.  Start antagonist when E2>300 or follicle >12 mm. Close monitoring at end of stim (daily or every other day- do not let her monitor longer than every other day).  -Plan ICSI with partner FRESH sperm and use ZYMOT  -Freeze all with PGT-A  -Recommend trigger with 17 mm follicle due to history of premature ovulation   NOTE: Intent for this cycle is for patient to freeze for her own use.  However, may still decide to make embryos gestational care eligible for future use (if so will need repeat Viromeds for both patient and partner)  **will NOT make these embryos GC eligible, patients plan is for autologous use      Here for US and/or lab monitoring; relevant findings reviewed.     Patient stayed for nurse visit. Pain is 0/10, saw nurse corie.  Pt was upset this morning not knowing whether to go for a retrieval with 2 follicles as this would be her last retrieval, mentioned possibly doing IUI and trying IVF again. Pt will likely desire a phone call from Dr. Root.    Team will contact patient later today with results and plan.  Shanae Araujo 05/28/25 10:07 AM      Patient aware of plan to continue same doses and come back tomorrow, Dr. Root called patient after patient requested- pt does not want to trigger for TIC or IUI and may want to cancel and do a FET or another retrieval. Patient can only do one more retrieval financially. Unsure if she will go for 2 follicles.  Shanae Araujo 05/28/25 2:41 PM

## 2025-05-28 NOTE — TELEPHONE ENCOUNTER
Called patient and reviewed options during stimulation  They are frustrated by cancelled cycles  Considering cancelling this one if sub-optimal to prepare for FET  They do not want to do IUI/TIC  Recommend patient continue plan to come tomorrow for monitoring and we can discuss options again tomorrow.    Poornima Root 05/28/25 2:28 PM

## 2025-05-29 ENCOUNTER — LAB REQUISITION (OUTPATIENT)
Dept: LAB | Facility: HOSPITAL | Age: 40
End: 2025-05-29
Payer: COMMERCIAL

## 2025-05-29 ENCOUNTER — ANCILLARY PROCEDURE (OUTPATIENT)
Dept: ENDOCRINOLOGY | Facility: CLINIC | Age: 40
End: 2025-05-29

## 2025-05-29 DIAGNOSIS — N97.9 FEMALE INFERTILITY: ICD-10-CM

## 2025-05-29 DIAGNOSIS — N97.9 FEMALE INFERTILITY, UNSPECIFIED: ICD-10-CM

## 2025-05-29 LAB
ESTRADIOL SERPL-MCNC: 1167 PG/ML
LH SERPL-ACNC: 3.5 IU/L
PROGEST SERPL-MCNC: 1.6 NG/ML

## 2025-05-29 PROCEDURE — 82670 ASSAY OF TOTAL ESTRADIOL: CPT

## 2025-05-29 PROCEDURE — 76857 US EXAM PELVIC LIMITED: CPT

## 2025-05-29 PROCEDURE — 83002 ASSAY OF GONADOTROPIN (LH): CPT

## 2025-05-29 PROCEDURE — 84144 ASSAY OF PROGESTERONE: CPT

## 2025-05-29 RX ORDER — CHORIONIC GONADOTROPIN 10000 UNIT
10000 KIT INTRAMUSCULAR ONCE AS NEEDED
Qty: 1 EACH | Refills: 0 | Status: SHIPPED | OUTPATIENT
Start: 2025-05-29

## 2025-05-29 RX ORDER — CLOMIPHENE CITRATE 50 MG/1
100 TABLET ORAL DAILY
Qty: 10 TABLET | Refills: 0 | Status: SHIPPED | OUTPATIENT
Start: 2025-05-29 | End: 2026-05-29

## 2025-05-29 NOTE — PROGRESS NOTES
CYCLING NOTE     Treatment protocol: patient will plan another IVF cycle with same protocol:   Luteal estrace Clomid mini stim  Clomid 100 mg CD3-7 with  overlap last 2 days  Add menopur with antagonist or as needed  +HGH throughout stim  NOTE: Recommend monitoring LH levels at EACH visit.  Start antagonist when E2>300 or follicle >12 mm. Close monitoring at end of stim (daily or every other day- do not let her monitor longer than every other day).  -Plan ICSI with partner FRESH sperm and use ZYMOT  -Freeze all with PGT-A  -Recommend trigger with 17 mm follicle due to history of premature ovulation   NOTE: Intent for this cycle is for patient to freeze for her own use.  However, may still decide to make embryos gestational care eligible for future use (if so will need repeat Viromeds for both patient and partner)  **will NOT make these embryos GC eligible, patients plan is for autologous use      Here for US and/or lab monitoring; relevant findings reviewed.     Patient stayed for nurse visit. Did not stay to see a nurse.    Shanae Araujo 05/29/25 12:58 PM    Called patient to review stim.  Reviewed two dominant follicles are ready for trigger today.  Likely 2 eggs, possibility for up to 3 with smaller follicles  Patient would prefer to cancel as there are only 2 leads.  She will trigger for TIC.  Does not want to do supplemental P4.  Would like to lead into another stim.  Will need to confirm with nurse that there is room on start calendar for patient to baseline with next menses.  Plan for checking P4 level on Tuesday- if confirmed, start luteal estrace to lead into Clomid mini stim (same protocol as last cycle- see notes regarding monitoring and trigger).  Additional note- please start Clomid NO LATER than CD3, even if she needs to baseline on a Saturday (thank you!)  Poornima Root 05/29/25 2:18 PM      Pt to trigger today with HCG 10,000 and have timed intercourse (recommend tomorrow and Saturday at  least). Will check P4 on Tuesday and start estrace if ovulatory. Will add to huddle and add to start calendar for weekend of the 13th. Will place refill of HCG and clomid, should have refills of other meds.   Will re-sign off BP with Dr. Root.  Shanae Araujo 05/29/25 2:49 PM

## 2025-05-29 NOTE — PROGRESS NOTES
Boarding Pass IVF/INJECTABLE TIC/IUI  IVF #6 (2nd, 4th and 5th cancelled)    *CREATING EMBRYOS FOR AUTOLOGOUS USE, INTENT IS TO CARRY HERSELF*     Age: 40 y.o.    Provider: Poornima Root MD  Primary RN: Shanae Araujo  Reasons for Treatment: Diminished Ovarian Reserve and chronic kidney disease  Last BMI  25 : 29.62 kg/m²       Medical History[1]    Date Done Consultation Results/Comments   25 Medication Protocol Lead in: estrace  Fertility Plan Update: Clomid Mini Stim  Clomid 100mg CD 3-7 with  overlap last 2 days  Add menopur with antagonist or as needed  +HGH during stimulation  **NOTES: Recommend monitoring LH levels at EACH visit.  Start antagonist when E2>300 or follicle >12 mm. Close monitoring at end of stim (daily or every other day- do not let her monitor longer than every other day).  Recommend trigger with 17 mm follicle due to history of premature ovulation   -Plan ICSI with FRESH sperm and use ZYMOT  -Freeze all with PGT-A  -** do not start Clomid later than cycle day 3 even if patient needs to baseline on a Saturday per RW    Trigger plan: HCG vs. Co-trigger  Pre-retrieval meds: Antibiotics per protocol  Adjuncts: HGH  Notes: Intent for this cycle is for autologous use   3/8/25 Authorization to Share [x]      5/10/24 IVF Consult  [x]    PGT-A/M? YES PGT-A   24 IVF Information and Authorization (to be completed annually) Received and in chart: Yes (Shanae Araujo RN)   3/20/25  Waiver (Out) Form Received and in chart: Yes (Shanae Araujo RN)   24 [employer] Packet [x]    25 Procedure Order Placed [x]       2024, 2024 MFM Consult Okay to proceed? Yes   24 Psych Consult Okay to proceed? Yes    Genetics Consult Okay to proceed? N/A    Other    Date Done Female Labs Results/Comments   2025 T&S (Q 1 Year) ABO: A  Rh: POS  Antibody: NEG     25  Hep B sAg NEGATIVE  (Viromeds scanned in)   25  Hep C AB NEGATIVE  (Viromeds scanned in)    4/7/25  HIV NEGATIVE  (Viromeds scanned in)   4/7/25  Syphilis NEGATIVE  (Viromeds scanned in)   4/7/25  GC/CT GC: NEGATIVE  (Viromeds scanned in)  CT: NEGATIVE  (Viromeds scanned in)   10/30/23 Rubella (Q 5 Years) Immune   10/30/23 Varicella (Q 5 Years) Immune   10/7/2024 TSH 2.53 (Ref range: 0.44 - 3.98 mIU/L)   4/2/2025 HgbA1C 5.4 (Ref range: <5.7 % of total Hgb)   4/23/24 AMH 0.84   2022 Carrier Screening Myriad 2bP: Completed previously at [facility]  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   Two echogenic areas superior to the fundal aspect of the endometrium.   Left hemorrhagic cyst.   There is no free fluid visualized in the peritoneal cavity.   (Has had many follicle scans since)    HSG: FL HYSTEROSALPINGOGRAM (10/22/2024):   IMPRESSION:  1. Correlate with real time fluoroscopic findings at the time of  procedure.  2. T shaped configuration of the cavity, otherwise normal study.    SIS:    Hyster: (12/12/2024) Procedure: Diagnostic Hysteroscopy   Preop diagnosis: IVF  Post op diagnosis: Same   Assistant: RAJNI Malloy    Anesthesia: None   IV: None   EBL: 3 cc  Specimens: None   Complications: None   Risks benefits and alternatives of the procedure explained to the patient and informed consent was obtained. Urine pregnancy test was performed and was negative. Time out was performed. The patient was placed in the dorsal lithotomy position and a sterile speculum was placed in the vagina. The cervix was sterilized with Betadine x3. Paracervical block with lidocaine 1% was administered.   Tenaculum: No  Dilation: No  The hysteroscope was placed in the cervix and advanced into the uterine cavity. Normal saline was used for distension media. Images were obtained and findings noted as below.   All instruments were then removed. Good hemostasis was noted. Patient tolerated  the procedure well returned to the recovery area in stable condition. .   Findings:   Cavity: Smooth cavity no lesions noted; cavity was noted to be narrow; endometrium healthy in appearance  Ostia: Bilateral tubal ostia visualized  Additional Notes: Patient with difficulty tolerating procedure from a comfort standpoint, but was able to complete the procedure    Jia Quiroz 12/12/24 3:03 PM              4/24/24 Pap Smear      Negative for intraepithelial lesion or malignancy.   HPV Negative      N/A Mammogram ( > 40) N/A          Date Done Male Labs   Results/Comments  ROBERT THOMPSON (MRN: [id number])   12/6/2024 Hep B sAg Nonreactive   12/6/2024 Hep C AB  Nonreactive   12/6/2024 HIV Nonreactive   12/6/2024 Syphilis Nonreactive   12/6/2024 GC/CT GC: Negative  CT: Negative   2022 Carrier Screening Myriad- completed at [facility]  Pos Biotinidase Deficiency   5/19/2025 Semen Analysis  Volume(mL): 4.8  Concentration(million/mL): 30.38  Motility(%): 51  Motile Count(million): 2.21   12/31/24 Sperm Freeze  # of vials: 2  TMS post thaw: 10.72    Per andrology has 6 frozen vials total, 4 with viromeds and 2 without (USE THE MOST RECENT FREEZE FOR BACKUP SAMPLE)  Planning to collect fresh   4/22/25 Sperm Prep order pended Yes   Date Done Miscellaneous Results/Comments    BMI Checklist  BMI > 40 or < 18 Added to chart:   No    >= 45 Checklist  Added to chart:   No   **Does not need to be completed prior to placing on IVF calendar**    MD Completion:  PAT needed: No  Ectopic Risk: No  Medically Complex: Yes cystic renal dysplasia    Reviewed and okay to proceed  Poornima Root 05/29/25 3:42 PM         [1]   Past Medical History:  Diagnosis Date    Anxiety disorder, unspecified     Anxiety    Infertility, female     Personal history of other diseases of the female genital tract     History of abnormal cervical Papanicolaou smear    Personal history of other specified conditions     History of headache    Raynaud's syndrome  without gangrene     Raynauds disease    Renal dysplasia     Cystic dysplasia of one kidney

## 2025-05-30 DIAGNOSIS — N97.9 FEMALE INFERTILITY: ICD-10-CM

## 2025-05-31 DIAGNOSIS — N97.9 FEMALE INFERTILITY: ICD-10-CM

## 2025-06-01 DIAGNOSIS — N97.9 FEMALE INFERTILITY: ICD-10-CM

## 2025-06-02 DIAGNOSIS — N97.9 FEMALE INFERTILITY: ICD-10-CM

## 2025-06-03 ENCOUNTER — LAB REQUISITION (OUTPATIENT)
Dept: LAB | Facility: HOSPITAL | Age: 40
End: 2025-06-03

## 2025-06-03 DIAGNOSIS — N97.9 FEMALE INFERTILITY: ICD-10-CM

## 2025-06-03 DIAGNOSIS — N97.9 FEMALE INFERTILITY, UNSPECIFIED: ICD-10-CM

## 2025-06-03 LAB — PROGEST SERPL-MCNC: 16.3 NG/ML

## 2025-06-03 PROCEDURE — 84144 ASSAY OF PROGESTERONE: CPT

## 2025-06-03 NOTE — PROGRESS NOTES
Patient going to lab today for P4 level to confirm ovulation to start luteal estrace for IVF cycle.  Patient triggered on 5/29 due to cancelled cycle.  Shanae Araujo 06/03/25 11:37 AM    P4 level not resulted will add to huddle for tomorrow.  Shanae Araujo 06/03/25 4:36 PM

## 2025-06-04 DIAGNOSIS — N97.9 FEMALE INFERTILITY: ICD-10-CM

## 2025-06-04 NOTE — PROGRESS NOTES
Patient going to lab today for P4 level to confirm ovulation to start luteal estrace for IVF cycle.  Patient triggered on 5/29 due to cancelled cycle.  Shanae Araujo 06/04/25 8:58 AM    Component      Latest Ref Rng 6/3/2025   Progesterone      ng/mL 16.3      [facility] PROVIDER NOTE- PROGESTERONE CHECK  Ultrasound and/or labs reviewed at [facility].   Recent Results (from the past 96 hours)   Progesterone    Collection Time: 06/03/25  1:19 PM   Result Value Ref Range    Progesterone 16.3 ng/mL       Ovulatory progesterone  yes    Next steps: Ok to start luteal estrace per protocol.   Maria Elena SY Fer  06/04/2025  11:12 AM    MC to patient to start 2mg oral estrace twice a day and will call with +UPT or menses for IVF cycle baseline. Pt to baseline by day 3, per Dr. Root does not want pt starting clomid past day 3 even if she has to baseline on a Saturday.  Shanae Araujo 06/04/25 11:17 AM

## 2025-06-16 ENCOUNTER — TELEPHONE (OUTPATIENT)
Dept: ENDOCRINOLOGY | Facility: CLINIC | Age: 40
End: 2025-06-16
Payer: COMMERCIAL

## 2025-06-16 DIAGNOSIS — N97.9 FEMALE INFERTILITY: ICD-10-CM

## 2025-06-16 DIAGNOSIS — Z01.812 PRE-PROCEDURE LAB EXAM: ICD-10-CM

## 2025-06-16 NOTE — TELEPHONE ENCOUNTER
Pt scheduled for Baseline for IVF tomorrow, E2, LH (LH at each visit) and CBC.  Pt to take estrace today.  Shanae Araujo, 06/16/2025 & 10:15 AM

## 2025-06-16 NOTE — TELEPHONE ENCOUNTER
Reason for call: reporting start of cycle  LMP: 06/15  Treatment type: IVF  Note: pt wants a call back for next steps

## 2025-06-17 ENCOUNTER — TELEPHONE (OUTPATIENT)
Dept: ENDOCRINOLOGY | Facility: CLINIC | Age: 40
End: 2025-06-17
Payer: COMMERCIAL

## 2025-06-17 ENCOUNTER — ANCILLARY PROCEDURE (OUTPATIENT)
Dept: ENDOCRINOLOGY | Facility: CLINIC | Age: 40
End: 2025-06-17
Payer: COMMERCIAL

## 2025-06-17 ENCOUNTER — LAB REQUISITION (OUTPATIENT)
Dept: LAB | Facility: HOSPITAL | Age: 40
End: 2025-06-17

## 2025-06-17 DIAGNOSIS — N97.9 FEMALE INFERTILITY: ICD-10-CM

## 2025-06-17 DIAGNOSIS — N80.9 ENDOMETRIOSIS: ICD-10-CM

## 2025-06-17 DIAGNOSIS — N97.9 FEMALE INFERTILITY, UNSPECIFIED: ICD-10-CM

## 2025-06-17 DIAGNOSIS — Z31.83 ENCOUNTER FOR ASSISTED REPRODUCTIVE FERTILITY CYCLE: ICD-10-CM

## 2025-06-17 LAB
ERYTHROCYTE [DISTWIDTH] IN BLOOD BY AUTOMATED COUNT: 12.1 % (ref 11.5–14.5)
ESTRADIOL SERPL-MCNC: 339 PG/ML
HCT VFR BLD AUTO: 40.6 % (ref 36–46)
HGB BLD-MCNC: 13.9 G/DL (ref 12–16)
LH SERPL-ACNC: 5.3 IU/L
MCH RBC QN AUTO: 29.3 PG (ref 26–34)
MCHC RBC AUTO-ENTMCNC: 34.2 G/DL (ref 32–36)
MCV RBC AUTO: 86 FL (ref 80–100)
NRBC BLD-RTO: 0 /100 WBCS (ref 0–0)
PLATELET # BLD AUTO: 308 X10*3/UL (ref 150–450)
PROGEST SERPL-MCNC: 1 NG/ML
RBC # BLD AUTO: 4.75 X10*6/UL (ref 4–5.2)
WBC # BLD AUTO: 10.1 X10*3/UL (ref 4.4–11.3)

## 2025-06-17 PROCEDURE — 84144 ASSAY OF PROGESTERONE: CPT

## 2025-06-17 PROCEDURE — 83002 ASSAY OF GONADOTROPIN (LH): CPT

## 2025-06-17 PROCEDURE — 85027 COMPLETE CBC AUTOMATED: CPT

## 2025-06-17 PROCEDURE — 76857 US EXAM PELVIC LIMITED: CPT

## 2025-06-17 PROCEDURE — 82670 ASSAY OF TOTAL ESTRADIOL: CPT

## 2025-06-17 NOTE — PROGRESS NOTES
Called patient to review baseline. She has two dominant follicles today. Discussed options with patient, including proceeding with cycle versus canceling to try a different protocol possibly with a luteal phase start. At this point patient would rather focus on transferring her current embryo. We briefly discussed protocol, including the need for Lupron suppression for two months. We may also consider Treat with doxycycline empirically. She will send us a my chart message once she discusses with her  to confirm the plan.

## 2025-06-17 NOTE — PROGRESS NOTES
CYCLING NOTE    Here for US and/or lab monitoring; relevant findings reviewed.    Patient stayed for nurse visit. Pain is 0/10,  Patient saw Bruna RN today, was upset that there were two follicles at baseline today, requesting phone call from Dr. Root today, will send message to physician.    Team will contact patient later today with results and plan.    Shanae Araujo  06/17/2025  8:25 AM      ===  Patient has already talked with dr root regarding plans. Interested in FET of single embryo.     Angie Her 06/17/25 12:49 PM    Pt will not be proceeding with IVF cycle, should ovulate follicles on her own and will plan for a FET cycle.  Will finalize plan with Dr. Root this week.  Shanae Araujo, 06/17/2025 & 2:46 PM

## 2025-06-20 ENCOUNTER — DOCUMENTATION (OUTPATIENT)
Dept: ENDOCRINOLOGY | Facility: CLINIC | Age: 40
End: 2025-06-20
Payer: COMMERCIAL

## 2025-06-20 NOTE — PROGRESS NOTES
MD Shanae Serrano, RN  Let's plan the followin. Transfer plan  Recommend Lupron/Aygestin/Letrozole x 2 months prior to FET- can start in luteal phase  Doxycycline 100 mg BID x 14 days- can start now  Programmed FET with estrace patches and IM P4 75 mg  Baby ASA 81 mg to start with FET    Thank you

## 2025-06-21 RX ORDER — DOXYCYCLINE 100 MG/1
100 CAPSULE ORAL 2 TIMES DAILY
Qty: 28 CAPSULE | Refills: 0 | Status: SHIPPED | OUTPATIENT
Start: 2025-06-21 | End: 2026-06-21

## 2025-06-21 RX ORDER — LETROZOLE 2.5 MG/1
5 TABLET, FILM COATED ORAL DAILY
Qty: 60 TABLET | Refills: 2 | Status: SHIPPED | OUTPATIENT
Start: 2025-06-21 | End: 2025-09-19

## 2025-06-21 RX ORDER — NORETHINDRONE 5 MG/1
5 TABLET ORAL DAILY
Qty: 30 TABLET | Refills: 2 | Status: SHIPPED | OUTPATIENT
Start: 2025-06-21 | End: 2026-06-21

## 2025-06-21 RX ORDER — LEUPROLIDE ACETATE 3.75 MG
3.75 KIT INTRAMUSCULAR
Qty: 1 KIT | Refills: 2 | Status: SHIPPED | OUTPATIENT
Start: 2025-06-21 | End: 2025-06-25 | Stop reason: SDUPTHER

## 2025-06-25 DIAGNOSIS — N97.9 FEMALE INFERTILITY: ICD-10-CM

## 2025-06-25 RX ORDER — LEUPROLIDE ACETATE 3.75 MG
3.75 KIT INTRAMUSCULAR
Qty: 1 KIT | Refills: 2 | Status: SHIPPED | OUTPATIENT
Start: 2025-06-25 | End: 2026-06-25

## 2025-06-25 RX ORDER — LEUPROLIDE ACETATE 1 MG/0.2ML
4 KIT SUBCUTANEOUS AS NEEDED
Qty: 1 KIT | Refills: 0 | Status: SHIPPED | OUTPATIENT
Start: 2025-06-25 | End: 2025-06-25 | Stop reason: WASHOUT

## 2025-06-26 DIAGNOSIS — N97.9 FEMALE INFERTILITY: ICD-10-CM

## 2025-06-26 NOTE — PROGRESS NOTES
P4 level ordered for patient to see if she ovulated. Patient came in for baseline on 6/17 (CD 3) and had a 14 and 15mm follicle. Patient is CD 12 today, since patient had those 2 follicles we figured pt would ovulate, no signs of ovulation yet per patient. Will check progesterone level to see where it is at.     Planning endo suppression either in the luteal phase or with menses.  Shanae Araujo, 06/26/2025 & 4:29 PM

## 2025-06-27 ENCOUNTER — LAB REQUISITION (OUTPATIENT)
Dept: LAB | Facility: HOSPITAL | Age: 40
End: 2025-06-27

## 2025-06-27 DIAGNOSIS — N97.9 FEMALE INFERTILITY, UNSPECIFIED: ICD-10-CM

## 2025-06-27 LAB — PROGEST SERPL-MCNC: 0.6 NG/ML

## 2025-06-27 PROCEDURE — 84144 ASSAY OF PROGESTERONE: CPT

## 2025-06-27 NOTE — PROGRESS NOTES
Patient presents for P4 level to see if she ovulated. Patient came in for baseline for FET cycle on 6/17 (CD 3) and had a 14 and 15mm follicle. Patient is CD 13 today.   Since patient had 2 follicles on baseline scan, patient expected to ovulate. Called yesterday to report no sign of ovulation yet.      FET Plan: Lupron/Aygestin/Letrozole x 2 months prior to FET- can start in luteal phase  Doxycycline 100 mg BID x 14 days, started on 6/18  Programmed FET with estrace patches and IM P4 75 mg  Baby ASA 81 mg to start with FET     Will review P4 with provider and contact patient with results and plan.      06/27/25 at 8:47 AM - ERASMO RAE RN     [facility] message sent inquiring if patient had P4 drawn today.  Lab still in active/needs to be collected status in Epic chart.  Will add to noon huddle for Monday 6/30.      06/27/25 at 4:28 PM - ERASMO RAE RN

## 2025-06-30 NOTE — PROGRESS NOTES
Patient went to lab 6/27 for P4 level to assess cycle. Pt came in for baseline on 6/17 (CD 3) and had a 14 and 15mm follicle (and elevated E2), patient was CD 13 when she went, now CD 16.  Pt planning FET cycle with menses.  Shanae Araujo, 06/30/2025 & 8:23 AM  ==    Will plan for full set of labs and scan tomorrow given still non ovulatory p4.     Angie Her 06/30/25 12:59 PM      Pt coming in tomorrow for E2, LH and P4 and scan to assess cycle.   Pt has aygestin and letrozole and depot lupron to be delivered 7/7. Will see if patient will do luteal phase start (likely) or menses start. May depend on results tomorrow.  Shanae Araujo, 06/30/2025 & 3:58 PM

## 2025-07-01 ENCOUNTER — LAB REQUISITION (OUTPATIENT)
Dept: LAB | Facility: HOSPITAL | Age: 40
End: 2025-07-01
Payer: COMMERCIAL

## 2025-07-01 ENCOUNTER — ANCILLARY PROCEDURE (OUTPATIENT)
Dept: ENDOCRINOLOGY | Facility: CLINIC | Age: 40
End: 2025-07-01

## 2025-07-01 DIAGNOSIS — N97.9 FEMALE INFERTILITY, UNSPECIFIED: ICD-10-CM

## 2025-07-01 DIAGNOSIS — N97.9 FEMALE INFERTILITY: ICD-10-CM

## 2025-07-01 LAB
ESTRADIOL SERPL-MCNC: 283 PG/ML
LH SERPL-ACNC: 13.8 IU/L
PROGEST SERPL-MCNC: 0.3 NG/ML

## 2025-07-01 PROCEDURE — 84144 ASSAY OF PROGESTERONE: CPT

## 2025-07-01 PROCEDURE — 83002 ASSAY OF GONADOTROPIN (LH): CPT

## 2025-07-01 PROCEDURE — 76857 US EXAM PELVIC LIMITED: CPT | Performed by: OBSTETRICS & GYNECOLOGY

## 2025-07-01 PROCEDURE — 82670 ASSAY OF TOTAL ESTRADIOL: CPT

## 2025-07-01 PROCEDURE — 76857 US EXAM PELVIC LIMITED: CPT

## 2025-07-01 NOTE — PROGRESS NOTES
CYCLING NOTE    Patient came in today to assess cycle.  Patient came in 6/17 for a baseline for IVF cycle on CD 3 to start clomid mini stim, had a 14 and 15mm follicle at baseline with E2 340 and last estrace was the day prior, believed these were active follicles and figured patient would ovulate these, patient has not had symptoms of ovulation like she normally does, did p4 level Friday 6/27 and P4 level was 0.6. Plan was to come in today for E2, LH, P4 and Follicle scan to assess.    Plan for patient is to do embryo transfer with euploid embryo and do triple suppression with depot lupron for 2 months prior (ideally starting in luteal phase).    Here for US and/or lab monitoring; relevant findings reviewed.    Patient did not stay for discussion after monitoring,  Team will contact patient later today with results and plan.    Shanae Araujo  07/01/2025  9:52 AM    ====  Will start suppression today.     Angie Her 07/01/25 1:28 PM    Let pt know plan, patient would prefer to monitor the next few days to see if ovulation symptoms occur and go from there as she has a 15mm follicle and elevated E2, slightly elevated LH.  Shanae Araujo, 07/01/2025 & 5:47 PM

## 2025-07-03 DIAGNOSIS — N97.9 FEMALE INFERTILITY: ICD-10-CM

## 2025-07-03 NOTE — PROGRESS NOTES
Patient messaged with [facility] the day after coming in for the US and labs (7/2), will check P4 level next Tuesday and if ovulation is confirmed will have patient come in the next day for depot lupron injection and start aygestin and letrozole.  Shanae Araujo, 07/03/2025 & 8:18 AM

## 2025-07-08 ENCOUNTER — LAB REQUISITION (OUTPATIENT)
Dept: LAB | Facility: HOSPITAL | Age: 40
End: 2025-07-08
Payer: COMMERCIAL

## 2025-07-08 DIAGNOSIS — Z32.00 ENCOUNTER FOR PREGNANCY TEST, RESULT UNKNOWN: ICD-10-CM

## 2025-07-08 DIAGNOSIS — N97.9 FEMALE INFERTILITY: ICD-10-CM

## 2025-07-08 DIAGNOSIS — N97.9 FEMALE INFERTILITY, UNSPECIFIED: ICD-10-CM

## 2025-07-08 LAB — PROGEST SERPL-MCNC: 10.2 NG/ML

## 2025-07-08 PROCEDURE — 84144 ASSAY OF PROGESTERONE: CPT

## 2025-07-08 NOTE — PROGRESS NOTES
Patient going to lab to confirm ovulation for endo suppression start. Patient got +[facility] on 7/3.   If ovulation confirmed plan is to come in tomorrow for depot lupron injection and start letrozole and aygestin.  Shanae Araujo, 07/08/2025 & 10:56 AM    [facility] PROVIDER NOTE  Ultrasound and/or labs reviewed at [facility].   Recent Results (from the past 24 hours)   Progesterone    Collection Time: 07/08/25 10:19 AM   Result Value Ref Range    Progesterone 10.2 ng/mL     Ok to start suppression.   Shanae Finney  07/08/2025  1:06 PM    Called pt with plan, pt to come in tomorrow at 8:30am for depot lupron injection with RN, will also start aygestin and letrozole tomorrow. Pt aware to bring in medication with her to the office, will do UPT prior.  Will plan for next injection ~8/7.  Shanae Araujo, 07/08/2025 & 2:49 PM

## 2025-07-09 ENCOUNTER — ANCILLARY PROCEDURE (OUTPATIENT)
Dept: ENDOCRINOLOGY | Facility: CLINIC | Age: 40
End: 2025-07-09
Payer: COMMERCIAL

## 2025-07-09 LAB — PREGNANCY TEST URINE, POC: NEGATIVE

## 2025-07-09 PROCEDURE — 96372 THER/PROPH/DIAG INJ SC/IM: CPT | Performed by: OBSTETRICS & GYNECOLOGY

## 2025-07-09 PROCEDURE — 2500000004 HC RX 250 GENERAL PHARMACY W/ HCPCS (ALT 636 FOR OP/ED): Mod: JZ | Performed by: OBSTETRICS & GYNECOLOGY

## 2025-07-09 RX ADMIN — LEUPROLIDE ACETATE 3.75 MG: KIT at 08:57

## 2025-07-09 NOTE — PROGRESS NOTES
Patient here for Lupron Depot injection #1.  3.75mg Lupron Depot injected into right ventrogluteal muscle.  Patient tolerated procedure well.  Patient has next injection scheduled for 8/7/25.      07/09/25 at 9:03 AM - ERASMO RAE RN

## 2025-07-12 DIAGNOSIS — E03.8 OTHER SPECIFIED HYPOTHYROIDISM: ICD-10-CM

## 2025-07-12 RX ORDER — LEVOTHYROXINE SODIUM 75 UG/1
75 TABLET ORAL DAILY
Qty: 30 TABLET | Refills: 2 | Status: SHIPPED | OUTPATIENT
Start: 2025-07-12

## 2025-07-16 DIAGNOSIS — Z31.41 FERTILITY TESTING: Primary | ICD-10-CM

## 2025-07-16 DIAGNOSIS — Z01.818 PRE-PROCEDURAL EXAMINATION: ICD-10-CM

## 2025-07-20 SDOH — ECONOMIC STABILITY: FOOD INSECURITY: WITHIN THE PAST 12 MONTHS, THE FOOD YOU BOUGHT JUST DIDN'T LAST AND YOU DIDN'T HAVE MONEY TO GET MORE.: NEVER TRUE

## 2025-07-20 SDOH — ECONOMIC STABILITY: FOOD INSECURITY: WITHIN THE PAST 12 MONTHS, YOU WORRIED THAT YOUR FOOD WOULD RUN OUT BEFORE YOU GOT MONEY TO BUY MORE.: NEVER TRUE

## 2025-07-20 SDOH — SOCIAL STABILITY: SOCIAL NETWORK: I HAVE TROUBLE DOING ALL OF MY REGULAR LEISURE ACTIVITIES WITH OTHERS: NEVER

## 2025-07-20 SDOH — SOCIAL STABILITY: SOCIAL NETWORK: I HAVE TROUBLE DOING ALL OF THE FAMILY ACTIVITIES THAT I WANT TO DO: RARELY

## 2025-07-20 SDOH — SOCIAL STABILITY: SOCIAL NETWORK

## 2025-07-20 SDOH — SOCIAL STABILITY: SOCIAL NETWORK: I HAVE TROUBLE DOING ALL OF MY USUAL WORK (INCLUDE WORK AT HOME): SOMETIMES

## 2025-07-20 SDOH — SOCIAL STABILITY: SOCIAL NETWORK: I HAVE TROUBLE DOING ALL OF THE ACTIVITIES WITH FRIENDS THAT I WANT TO DO: RARELY

## 2025-07-20 SDOH — SOCIAL STABILITY: SOCIAL NETWORK: PROMIS ABILITY TO PARTICIPATE IN SOCIAL ROLES & ACTIVITIES T-SCORE: 52

## 2025-07-20 ASSESSMENT — ANXIETY QUESTIONNAIRES
PAST MONTH HOW OFTEN HAVE YOU FELT THAT THINGS WERE GOING YOUR WAY: 1 - ALMOST NEVER
PAST MONTH HOW OFTEN HAVE YOU FELT THAT YOU WERE UNABLE TO CONTROL THE IMPORTANT THINGS IN YOUR LIFE: 2 - SOMETIMES
PAST MONTH HOW OFTEN HAVE YOU FELT CONFIDENT ABOUT YOUR ABILITY TO HANDLE YOUR PROBLEMS: 3 - FAIRLY OFTEN
PAST MONTH HOW OFTEN HAVE YOU FELT CONFIDENT ABOUT YOUR ABILITY TO HANDLE YOUR PROBLEMS: 3 - FAIRLY OFTEN
PAST MONTH HOW OFTEN HAVE YOU FELT THAT YOU WERE UNABLE TO CONTROL THE IMPORTANT THINGS IN YOUR LIFE: 2 - SOMETIMES
PAST MONTH HOW OFTEN HAVE YOU FELT DIFFICULTIES WERE PILING UP SO HIGH THAT YOU COULD NOT OVERCOME THEM: 1 - ALMOST NEVER

## 2025-07-20 ASSESSMENT — PROMIS GLOBAL HEALTH SCALE
RATE_AVERAGE_PAIN: 1
RATE_GENERAL_HEALTH: VERY GOOD
RATE_MENTAL_HEALTH: GOOD
CARRYOUT_SOCIAL_ACTIVITIES: VERY GOOD
RATE_SOCIAL_SATISFACTION: GOOD
RATE_QUALITY_OF_LIFE: VERY GOOD
RATE_PHYSICAL_HEALTH: VERY GOOD
RATE_AVERAGE_FATIGUE: MILD
CARRYOUT_PHYSICAL_ACTIVITIES: COMPLETELY
EMOTIONAL_PROBLEMS: NEVER

## 2025-07-21 ENCOUNTER — APPOINTMENT (OUTPATIENT)
Dept: INTEGRATIVE MEDICINE | Facility: CLINIC | Age: 40
End: 2025-07-21
Payer: COMMERCIAL

## 2025-07-21 DIAGNOSIS — N97.9 INFERTILITY, FEMALE: Primary | ICD-10-CM

## 2025-07-21 PROCEDURE — 97810 ACUP 1/> WO ESTIM 1ST 15 MIN: CPT | Performed by: ACUPUNCTURIST

## 2025-07-21 PROCEDURE — 99202 OFFICE O/P NEW SF 15 MIN: CPT | Performed by: ACUPUNCTURIST

## 2025-07-21 PROCEDURE — 97811 ACUP 1/> W/O ESTIM EA ADD 15: CPT | Performed by: ACUPUNCTURIST

## 2025-07-21 NOTE — PROGRESS NOTES
"Acupuncture Visit:     Subjective   Patient ID: Marcy Putnam is a 40 y.o. female who presents for No chief complaint on file.  Self Pay    States she has been having fertility issues for 2 + yrs.  Notes having done 2 IVF and 1 IUI.  Notes she has a lot of stress and anxiety surrounding her fertility issues.  Upcoming IVF for September 2025 with 1 viable embryo.  Hx of genetically defective embryos.  Relevant diagnoses includes adenomyosis and possible endometriosis.  She is under the care of endo at [facility]  Currently on suppression meds x 2 wks  States her  was evaluated, some genetic abnormalities. States her and  have decided not to go down this route.  Menses hx is normal. States she has always has regular \"like clock work\" periods.  Other med hx included IBS with GERD. Sx include alt const with diarrhea. States she is not talking med for either of these issues.  States she avoids dairy for the most part. Tries low FOD map diet which is how she found out about dairy.  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   States she was on growth hormone and thyroid meds from the age of about 4-6yo- 15 years old. Notes normal puberty.          Session Information  Is this acupuncture treatment being billed to the patient's insurance company: No  Visit Type: New patient         Review of Systems         Provider reviewed plan for the acupuncture session, precautions and contraindications. Patient/guardian/hospital staff has given consent to treat with full understanding of what to expect during the session. Before acupuncture began, provider explained to the patient to communicate at any time if the procedure was causing discomfort past their tolerance level. Patient agreed to advise acupuncturist. The acupuncturist counseled the patient on the risks of acupuncture treatment including pain, infection, bleeding, and no relief of pain. The patient was positioned comfortably. There was no evidence of infection at " the site of needle insertions.    Objective   Physical Exam              Acupuncture Treatment  Body Points - Left: LI 4, SP 4  Body Points - Bilateral: YT, Kd 6, SP 6, ST 36  Body Points - Right: P 6, Lv 3  Other Techniques Utilized: TDP Lamp  TDP Lamp Descripton: feet  Needle Count In: 11  Needle Count Out: 11  Total Face to Face Time (min): 30 minutes              Assessment/Plan

## 2025-08-01 ENCOUNTER — APPOINTMENT (OUTPATIENT)
Dept: INTEGRATIVE MEDICINE | Facility: CLINIC | Age: 40
End: 2025-08-01
Payer: COMMERCIAL

## 2025-08-01 DIAGNOSIS — N97.9 INFERTILITY, FEMALE: Primary | ICD-10-CM

## 2025-08-01 PROCEDURE — 97811 ACUP 1/> W/O ESTIM EA ADD 15: CPT | Performed by: ACUPUNCTURIST

## 2025-08-01 PROCEDURE — 97810 ACUP 1/> WO ESTIM 1ST 15 MIN: CPT | Performed by: ACUPUNCTURIST

## 2025-08-01 NOTE — PATIENT INSTRUCTIONS
Lifestyle Recommendations  Nutrition:  Eat protein with each meal and snack to keep blood sugar stable.  Eat regularly throughout the day.  Always strive to increase vegetable intake.  Goal is 5-8 servings per day!  ½ cup cook, 1 cup raw, or 2 cups raw greens are considered 1 serving. Antioxidants!!  Get 30 different varieties per week of whole plant foods.   Add a little healthy fat to every meal.  Olive oil, avocado or avocado oil, nuts/seeds.  Focus on warm, cooked, and easy to digest foods.  Less raw foods.  No iced drinks.   Check out this article about the optimal fertility diet. https://[employer]/the-optimal-fertility-diet/   Sleep: Try for at least 7-8 hours per night in a dark, cool room.  Practice good sleep hygiene by winding down before bed, having a bedtime that you stick to, and limiting blue light exposure from tablets/phones/computers/TV for at least 1 hour before bed.    Stress Management: Try managing stress with gratitude journaling, breathwork, and meditation.    Check out our free Guided Meditations on our website.  [email or web address]     Try 4-7-8 Breathing Technique through the day…striving for twice per day at a minimum.  Check out www.circlebloom.com for fertility meditations.  Coupon Code: WkksxukriLunkni00 for 20% off.  This site has free meditations for grief and miscarriage.  Consider joining our Monday night free Fertility Support Group on Zoom.  Movement: You are doing a great job with exercise.  Be sure to include both cardio and resistance training to build muscle!  Reduce toxin exposures: Remove artificial fragrances from home and add houseplants.  Artificial fragrances contain phthalates which are endocrine disruptors.  Artificial fragrances are found in candles, air fresheners, dryer sheets, laundry detergent, soaps, shampoos, lotions, perfume, etc.  Check out www.ewg.org and look for their Cosmetics Database called Skin Deep to check on your personal care  products.  This website also lists the Dirty Dozen for produce that you may want to buy organic.  Try to avoid storing food and drinks in plastic and switch to glass instead.   Supplements:   You can buy high quality supplements at 10% off through the [employer] account at [email or web address]

## 2025-08-01 NOTE — PROGRESS NOTES
"Acupuncture Visit:     Subjective   Patient ID: Marcy Putnam is a 40 y.o. female who presents for Fertility Support and Stress    Fertility Support: She is working with  Fertility Center. She had 6 IVF cycles this year with 4 canceled retrieval cycles and 2 completed retrieval cycles and has one euploid embryo that she will be transferring in September. She is currently on suppression due to adenomyosis and endometriosis. She has another month to go for suppression.    She has a hysteroscopy scheduled for next week. After next month of suppression, she will move on to FET cycle.    Supplements: vitamin D 4000IU, prenatal (Smarty Pants)  Medications: suppression medications, valacyclovir, synthroid    Initial intake with [facility] S[facility]cco:   States she has been having fertility issues for 2 + yrs.  Notes having done 2 IVF and 1 IUI.  Notes she has a lot of stress and anxiety surrounding her fertility issues.  Upcoming IVF for September 2025 with 1 viable embryo.  Hx of genetically defective embryos.  Relevant diagnoses includes adenomyosis and possible endometriosis.  She is under the care of endo at  Clinic  Currently on suppression meds x 2 wks  States her  was evaluated, some genetic abnormalities. States her and  have decided not to go down this route.  Menses hx is normal. States she has always has regular \"like clock work\" periods.  Other med hx included IBS with GERD. Sx include alt const with diarrhea. States she is not talking med for either of these issues.  States she avoids dairy for the most part. Tries low FOD map diet which is how she found out about dairy.  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   States she was on growth hormone and thyroid meds from the age of about 4-6yo- 15 years old. Notes normal puberty.          Session Information  Is this acupuncture treatment being billed to the patient's insurance company: No  Visit Type: Follow-up visit  Medical History Reviewed: I have " reviewed pertinent medical history in EHR, and no contraindications are present to provide treatment         Review of Systems    Sleep: she is getting about 6 hours per night and staying up too late.  Hoping to improve this pattern.    Digestion: struggling with IBS and often has bloating and GI issues with alternating constipation and diarrhea.    Stress: high.        Provider reviewed plan for the acupuncture session, precautions and contraindications. Patient/guardian/hospital staff has given consent to treat with full understanding of what to expect during the session. Before acupuncture began, provider explained to the patient to communicate at any time if the procedure was causing discomfort past their tolerance level. Patient agreed to advise acupuncturist. The acupuncturist counseled the patient on the risks of acupuncture treatment including pain, infection, bleeding, and no relief of pain. The patient was positioned comfortably. There was no evidence of infection at the site of needle insertions.    Objective   Physical Exam              Acupuncture Treatment  Patient Position: Supine on a table  Acupuncture Needling: Yes  Needle Guage: 36 guage /.20/ Blue seirin  Body Points: With retention  Body Points - Bilateral: Du 20, LI 4, R 12, ST 25, R 6, zigong, ST 36, SP 9, SP 6, KD 3, JANELLE 3  Auricular Points: No  Electroacupuncture Used: No  Other Techniques Utilized: Ear seeds, TDP Lamp  Ear Seeds: Stainless steel (shenmen)  TDP Lamp Descripton: feet and abdomen  Needle Count In: 19  Needle Count Out: 19  Needle Retention Time (min): 25 minutes  Total Face to Face Time (min): 25 minutes  Supplement(s) 1: Recommend decreasing Vitamin D3 to 2000IU daily at transfer.              Assessment/Plan   1. Infertility, female

## 2025-08-05 DIAGNOSIS — N80.9 ENDOMETRIOSIS: ICD-10-CM

## 2025-08-05 DIAGNOSIS — Z13.29 SCREENING FOR THYROID DISORDER: ICD-10-CM

## 2025-08-05 DIAGNOSIS — Z32.00 ENCOUNTER FOR PREGNANCY TEST, RESULT UNKNOWN: ICD-10-CM

## 2025-08-06 ENCOUNTER — PREP FOR PROCEDURE (OUTPATIENT)
Dept: ENDOCRINOLOGY | Facility: CLINIC | Age: 40
End: 2025-08-06
Payer: COMMERCIAL

## 2025-08-06 RX ORDER — KETOROLAC TROMETHAMINE 30 MG/ML
30 INJECTION, SOLUTION INTRAMUSCULAR; INTRAVENOUS ONCE AS NEEDED
Status: CANCELLED | OUTPATIENT
Start: 2025-08-06 | End: 2025-08-11

## 2025-08-06 RX ORDER — ACETAMINOPHEN 325 MG/1
650 TABLET ORAL ONCE AS NEEDED
Status: CANCELLED | OUTPATIENT
Start: 2025-08-06

## 2025-08-07 ENCOUNTER — APPOINTMENT (OUTPATIENT)
Dept: INTEGRATIVE MEDICINE | Facility: CLINIC | Age: 40
End: 2025-08-07
Payer: COMMERCIAL

## 2025-08-07 ENCOUNTER — ANCILLARY PROCEDURE (OUTPATIENT)
Dept: ENDOCRINOLOGY | Facility: CLINIC | Age: 40
End: 2025-08-07
Payer: COMMERCIAL

## 2025-08-07 ENCOUNTER — HOSPITAL ENCOUNTER (OUTPATIENT)
Dept: ENDOCRINOLOGY | Facility: CLINIC | Age: 40
Discharge: HOME | End: 2025-08-07
Payer: COMMERCIAL

## 2025-08-07 VITALS
HEART RATE: 73 BPM | HEIGHT: 65 IN | RESPIRATION RATE: 16 BRPM | SYSTOLIC BLOOD PRESSURE: 150 MMHG | DIASTOLIC BLOOD PRESSURE: 77 MMHG | TEMPERATURE: 97.9 F | WEIGHT: 183.42 LBS | OXYGEN SATURATION: 100 % | BODY MASS INDEX: 30.56 KG/M2

## 2025-08-07 DIAGNOSIS — Z31.41 FERTILITY TESTING: ICD-10-CM

## 2025-08-07 DIAGNOSIS — N97.9 INFERTILITY, FEMALE: Primary | ICD-10-CM

## 2025-08-07 LAB — PREGNANCY TEST URINE, POC: NEGATIVE

## 2025-08-07 PROCEDURE — 58555 HYSTEROSCOPY DX SEP PROC: CPT | Mod: GC | Performed by: OBSTETRICS & GYNECOLOGY

## 2025-08-07 PROCEDURE — 96372 THER/PROPH/DIAG INJ SC/IM: CPT | Performed by: NURSE PRACTITIONER

## 2025-08-07 PROCEDURE — 97810 ACUP 1/> WO ESTIM 1ST 15 MIN: CPT | Performed by: ACUPUNCTURIST

## 2025-08-07 PROCEDURE — 81025 URINE PREGNANCY TEST: CPT | Mod: GC | Performed by: OBSTETRICS & GYNECOLOGY

## 2025-08-07 PROCEDURE — 2500000004 HC RX 250 GENERAL PHARMACY W/ HCPCS (ALT 636 FOR OP/ED): Mod: JZ | Performed by: NURSE PRACTITIONER

## 2025-08-07 PROCEDURE — 97811 ACUP 1/> W/O ESTIM EA ADD 15: CPT | Performed by: ACUPUNCTURIST

## 2025-08-07 RX ORDER — ACETAMINOPHEN 325 MG/1
650 TABLET ORAL ONCE AS NEEDED
Status: DISCONTINUED | OUTPATIENT
Start: 2025-08-07 | End: 2025-08-08 | Stop reason: HOSPADM

## 2025-08-07 RX ORDER — KETOROLAC TROMETHAMINE 30 MG/ML
30 INJECTION, SOLUTION INTRAMUSCULAR; INTRAVENOUS ONCE AS NEEDED
Status: DISCONTINUED | OUTPATIENT
Start: 2025-08-07 | End: 2025-08-08 | Stop reason: HOSPADM

## 2025-08-07 RX ADMIN — LEUPROLIDE ACETATE 3.75 MG: KIT at 13:30

## 2025-08-07 ASSESSMENT — PAIN - FUNCTIONAL ASSESSMENT: PAIN_FUNCTIONAL_ASSESSMENT: 0-10

## 2025-08-07 ASSESSMENT — PAIN SCALES - GENERAL: PAINLEVEL_OUTOF10: 0 - NO PAIN

## 2025-08-07 NOTE — PROGRESS NOTES
"Acupuncture Visit:     Subjective   Patient ID: Marcy Putnam is a 40 y.o. female who presents for Fertility Support    Fertility Support: She had a period for the past 3 days and is due for a Lupron injection today and a hysteroscopy.  Period was more painful than usual but flow was about the same. She has been getting some headaches other than that she is feeling good with lower estrogen.     Supplements: vitamin D 4000IU, prenatal (Smarty Pants)  Medications: suppression medications, valacyclovir, synthroid    Initial intake with [facility] S[facility]:   States she has been having fertility issues for 2 + yrs.  Notes having done 2 IVF and 1 IUI.  Notes she has a lot of stress and anxiety surrounding her fertility issues.  Upcoming IVF for September 2025 with 1 viable embryo.  Hx of genetically defective embryos.  Relevant diagnoses includes adenomyosis and possible endometriosis.  She is under the care of endo at [facility]  Currently on suppression meds x 2 wks  States her  was evaluated, some genetic abnormalities. States her and  have decided not to go down this route.  Menses hx is normal. States she has always has regular \"like clock work\" periods.  Other med hx included IBS with GERD. Sx include alt const with diarrhea. States she is not talking med for either of these issues.  States she avoids dairy for the most part. Tries low FOD map diet which is how she found out about dairy.  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   States she was on growth hormone and thyroid meds from the age of about 4-6yo- 15 years old. Notes normal puberty.          Session Information  Is this acupuncture treatment being billed to the patient's insurance company: No  Visit Type: Follow-up visit  Medical History Reviewed: I have reviewed pertinent medical history in EHR, and no contraindications are present to provide treatment         Review of Systems    Sleep: she is going to bed a little earlier and getting about 7 hours " of sleep most nights.     Digestion: she was constipated before acupuncture and acupuncture helped but now she has loose stool    Diet: tries to eat less dairy - eats daily but in small amounts    Breakfast: eggs, avocado, salsa (sometimes cereal)  Lunch: eats [employer]  Dinner: rotates 5 meals. Taco night, chicken/broccoli/rice/cheese, usually always a sauteed greens with shallot/garlic, meat, low carb    Drink: waterloo/bubly, filtered water, black coffee  Snacks: might be snacking too much    Stress: high this past week with work.        Provider reviewed plan for the acupuncture session, precautions and contraindications. Patient/guardian/hospital staff has given consent to treat with full understanding of what to expect during the session. Before acupuncture began, provider explained to the patient to communicate at any time if the procedure was causing discomfort past their tolerance level. Patient agreed to advise acupuncturist. The acupuncturist counseled the patient on the risks of acupuncture treatment including pain, infection, bleeding, and no relief of pain. The patient was positioned comfortably. There was no evidence of infection at the site of needle insertions.    Objective   Physical Exam              Acupuncture Treatment  Patient Position: Supine on a table  Acupuncture Needling: Yes  Needle Guage: 36 guage /.20/ Blue seirin  Body Points: With retention  Body Points - Bilateral: Du 20, LI 4, R 12, ST 25, R 6, zigong, ST 36, SP 9, SP 6, KD 3, JANELLE 3  Auricular Points: No  Electroacupuncture Used: No  Other Techniques Utilized: Ear seeds, TDP Lamp  Ear Seeds: Stainless steel (shenmen)  TDP Lamp Descripton: feet and abdomen  Needle Count In: 19  Needle Count Out: 19  Needle Retention Time (min): 25 minutes  Total Face to Face Time (min): 25 minutes              Assessment/Plan   1. Infertility, female

## 2025-08-07 NOTE — PROGRESS NOTES
Patient ID: Marcy Putnam is a 40 y.o. female.    Diagnostic Hysteroscopy w Possible Polypectomy    Date/Time: 8/7/2025 2:17 PM    Performed by: Angie Her MD  Authorized by: Poornima Root MD    Consent:     Consent obtained:  Verbal and written    Consent given by:  Patient    Risks, benefits, and alternatives were discussed: yes      Risks discussed:  Bleeding, infection and pain  Universal protocol:     Procedure explained and questions answered to patient or proxy's satisfaction: yes      Relevant documents present and verified: yes      Test results available: yes      Imaging studies available: yes      Required blood products, implants, devices, and special equipment available: yes      Immediately prior to procedure, a time out was called: yes      Patient identity confirmed:  Verbally with patient, arm band and hospital-assigned identification number  Pre-procedure details:     Skin preparation:  Povidone-iodine  Procedure specific details:      Procedure: Diagnostic Hysteroscopy   Preop diagnosis: fertility testing  Post op diagnosis: Same   Assistant: Fellow    Anesthesia: None   IV: None   EBL: 3 cc  Specimens: None   Complications: None   Risks benefits and alternatives of the procedure explained to the patient and informed consent was obtained. Urine pregnancy test was performed and was negative. Time out was performed. The patient was placed in the dorsal lithotomy position and a sterile speculum was placed in the vagina. The cervix was sterilized with Betadine x3. Paracervical block with lidocaine 1% was administered.   Tenaculum: YES  Dilation: NO  The hysteroscope was placed in the cervix and advanced into the uterine cavity. Normal saline was used for distension media. Images were obtained and findings noted as below.   All instruments were then removed. Good hemostasis was noted. Patient tolerated the procedure well returned to the recovery area in stable condition. .   Findings:    Cavity: Smooth cavity no lesions noted  Ostia: Bilateral tubal ostia visualized  Additional Notes:    Angie Her MD           Post-procedure details:     Procedure completion:  Tolerated well, no immediate complications      Attestation: I reviewed the resident/fellow's documentation and discussed the patient with the resident/fellow. I agree with the resident/fellow's medical decision making as documented in the resident/fellow's note.   Jia Quiroz 08/07/25 2:37 PM

## 2025-08-07 NOTE — PROGRESS NOTES
Patient her for 2nd lupron injection.   Given in left gluteal muscle.  Patient tolerated well.    Inocencia Walters 08/07/25 1:34 PM

## 2025-08-08 LAB — TSH SERPL-ACNC: 1.51 MIU/L

## 2025-08-12 ENCOUNTER — APPOINTMENT (OUTPATIENT)
Dept: INTEGRATIVE MEDICINE | Facility: CLINIC | Age: 40
End: 2025-08-12
Payer: COMMERCIAL

## 2025-08-12 DIAGNOSIS — N97.9 INFERTILITY, FEMALE: Primary | ICD-10-CM

## 2025-08-12 PROCEDURE — 97810 ACUP 1/> WO ESTIM 1ST 15 MIN: CPT | Performed by: ACUPUNCTURIST

## 2025-08-12 PROCEDURE — 97811 ACUP 1/> W/O ESTIM EA ADD 15: CPT | Performed by: ACUPUNCTURIST

## 2025-08-13 DIAGNOSIS — N97.9 FEMALE INFERTILITY: ICD-10-CM

## 2025-08-13 RX ORDER — PROGESTERONE 50 MG/ML
50 INJECTION, SOLUTION INTRAMUSCULAR DAILY
Qty: 50 ML | Refills: 3 | Status: SHIPPED | OUTPATIENT
Start: 2025-08-13 | End: 2026-08-13

## 2025-08-13 RX ORDER — LIDOCAINE AND PRILOCAINE 25; 25 MG/G; MG/G
CREAM TOPICAL DAILY
Qty: 30 G | Refills: 2 | Status: SHIPPED | OUTPATIENT
Start: 2025-08-13 | End: 2026-08-13

## 2025-08-13 RX ORDER — ASPIRIN 81 MG/1
81 TABLET ORAL DAILY
Start: 2025-08-13 | End: 2026-08-13

## 2025-08-13 RX ORDER — ESTRADIOL 0.1 MG/D
3 FILM, EXTENDED RELEASE TRANSDERMAL
Qty: 48 PATCH | Refills: 2 | Status: SHIPPED | OUTPATIENT
Start: 2025-08-14 | End: 2026-08-14

## 2025-08-13 RX ORDER — PROPYLENE GLYCOL/PEG 400 0.3 %-0.4%
1 DROPS OPHTHALMIC (EYE) DAILY
Qty: 30 EACH | Refills: 3 | Status: SHIPPED | OUTPATIENT
Start: 2025-08-13

## 2025-08-14 PROCEDURE — RXMED WILLOW AMBULATORY MEDICATION CHARGE

## 2025-08-19 ENCOUNTER — SPECIALTY PHARMACY (OUTPATIENT)
Dept: PHARMACY | Facility: CLINIC | Age: 40
End: 2025-08-19

## 2025-08-19 ENCOUNTER — PHARMACY VISIT (OUTPATIENT)
Dept: PHARMACY | Facility: CLINIC | Age: 40
End: 2025-08-19
Payer: MEDICARE

## 2025-08-21 ENCOUNTER — APPOINTMENT (OUTPATIENT)
Dept: INTEGRATIVE MEDICINE | Facility: CLINIC | Age: 40
End: 2025-08-21
Payer: COMMERCIAL

## 2025-08-21 DIAGNOSIS — N97.9 INFERTILITY, FEMALE: Primary | ICD-10-CM

## 2025-08-21 PROCEDURE — 97810 ACUP 1/> WO ESTIM 1ST 15 MIN: CPT | Performed by: ACUPUNCTURIST

## 2025-08-21 PROCEDURE — 97811 ACUP 1/> W/O ESTIM EA ADD 15: CPT | Performed by: ACUPUNCTURIST

## 2025-08-29 ENCOUNTER — APPOINTMENT (OUTPATIENT)
Dept: INTEGRATIVE MEDICINE | Facility: CLINIC | Age: 40
End: 2025-08-29
Payer: COMMERCIAL

## 2025-08-29 DIAGNOSIS — N97.9 INFERTILITY, FEMALE: Primary | ICD-10-CM

## 2025-08-29 PROCEDURE — 97811 ACUP 1/> W/O ESTIM EA ADD 15: CPT | Performed by: ACUPUNCTURIST

## 2025-08-29 PROCEDURE — 97810 ACUP 1/> WO ESTIM 1ST 15 MIN: CPT | Performed by: ACUPUNCTURIST

## 2025-09-04 ENCOUNTER — APPOINTMENT (OUTPATIENT)
Dept: INTEGRATIVE MEDICINE | Facility: CLINIC | Age: 40
End: 2025-09-04
Payer: COMMERCIAL

## 2025-09-09 ENCOUNTER — APPOINTMENT (OUTPATIENT)
Dept: INTEGRATIVE MEDICINE | Facility: CLINIC | Age: 40
End: 2025-09-09
Payer: COMMERCIAL

## 2025-09-16 ENCOUNTER — APPOINTMENT (OUTPATIENT)
Dept: INTEGRATIVE MEDICINE | Facility: CLINIC | Age: 40
End: 2025-09-16
Payer: COMMERCIAL

## 2025-09-26 ENCOUNTER — APPOINTMENT (OUTPATIENT)
Dept: INTEGRATIVE MEDICINE | Facility: CLINIC | Age: 40
End: 2025-09-26
Payer: COMMERCIAL